# Patient Record
Sex: FEMALE | Race: WHITE | NOT HISPANIC OR LATINO
[De-identification: names, ages, dates, MRNs, and addresses within clinical notes are randomized per-mention and may not be internally consistent; named-entity substitution may affect disease eponyms.]

---

## 2019-02-12 ENCOUNTER — APPOINTMENT (OUTPATIENT)
Dept: HEART AND VASCULAR | Facility: CLINIC | Age: 64
End: 2019-02-12

## 2019-02-21 PROBLEM — F32.9 DEPRESSION, UNSPECIFIED DEPRESSION TYPE: Status: ACTIVE | Noted: 2019-02-21

## 2019-02-25 ENCOUNTER — NON-APPOINTMENT (OUTPATIENT)
Age: 64
End: 2019-02-25

## 2019-02-25 ENCOUNTER — APPOINTMENT (OUTPATIENT)
Dept: HEART AND VASCULAR | Facility: CLINIC | Age: 64
End: 2019-02-25
Payer: COMMERCIAL

## 2019-02-25 VITALS — HEIGHT: 66 IN | WEIGHT: 181 LBS | BODY MASS INDEX: 29.09 KG/M2

## 2019-02-25 VITALS — DIASTOLIC BLOOD PRESSURE: 77 MMHG | SYSTOLIC BLOOD PRESSURE: 125 MMHG | HEART RATE: 78 BPM | OXYGEN SATURATION: 93 %

## 2019-02-25 DIAGNOSIS — F32.9 MAJOR DEPRESSIVE DISORDER, SINGLE EPISODE, UNSPECIFIED: ICD-10-CM

## 2019-02-25 PROCEDURE — 99214 OFFICE O/P EST MOD 30 MIN: CPT | Mod: 25

## 2019-02-25 PROCEDURE — 93000 ELECTROCARDIOGRAM COMPLETE: CPT

## 2019-02-25 NOTE — REASON FOR VISIT
[Follow-Up - Clinic] : a clinic follow-up of [FreeTextEntry1] : Diagnostic Tests:\par --------------------------------------\par ECG:\par 02/25/19: NSR, normal ECG. \par 04/09/18: NSR, frequent APCs.\par 08/11/16: NSR, normal ECG.\par --------------------------------------\par Echo:\par 05/01/18: EF 63%, trace MR/TR. \par 08/04/16: EF 65%, grade I diastolic dysfunction, trace TR\par --------------------------------------\par CT:\par 08/03/16: head: normal\par 08/03/16: CTA head and neck: normal \par --------------------------------------\par MR:\par 08/04/16: brain: small left insula infarct.

## 2019-02-25 NOTE — HISTORY OF PRESENT ILLNESS
[FreeTextEntry1] : Ms. Dasilva presents for follow up and management of impaired fasting glucose, dyslipidemia, AYE, and left MCA CVA (08/03/16). She was admitted to [facility] on 08/03/16 after having sudden onset of left hand numbness and aphasia. The symptoms persisted for several hours. She had a CT head and CTA head an neck on 08/03/16 both of which were normal. She had a MR of the brain on 08/04/16 which revealed a small left insula infarct. She was evaluated by neurology, Cheyenne Kilpatrick MD and was diagnosed with a left MCA CVA. She also saw a heart rhythm doctor, Arie Kerns MD, and had an loop recorder implanted. She had an echocardiogram which revealed normal LV systolic function and no valvular heart disease. She had paroxysmal atrial tachycardia as a child. Since being discharged, she has not had recurrence of her symptoms. We had an extensive discussion about the embolic nature of her stroke and the likelihood that there may be occult atrial fibrillation. She has completed the RESPECT-ESUS research trial for patients with embolic CVA of undetermined source to study ASA vs. Pradaxa.  She denies palpitations or bleeding. Her implantable loop recorder interrogation today did not reveal any evidence of dysrhythmia.   She had followed with a psychiatrist in the past and was on an SSRI. We had an extensive discussion about strategies to treat her depression and, although she does not want to start seeing a psychiatrist again and is not willing to start an SSRI at this time. She feels better regarding her affective disorder. She has complaints of rare palpitations but is otherwise well.

## 2019-02-25 NOTE — PHYSICAL EXAM
[General Appearance - Well Developed] : well developed [Normal Appearance] : normal appearance [Well Groomed] : well groomed [General Appearance - Well Nourished] : well nourished [No Deformities] : no deformities [General Appearance - In No Acute Distress] : no acute distress [Normal Conjunctiva] : the conjunctiva exhibited no abnormalities [Eyelids - No Xanthelasma] : the eyelids demonstrated no xanthelasmas [Normal Oral Mucosa] : normal oral mucosa [No Oral Pallor] : no oral pallor [No Oral Cyanosis] : no oral cyanosis [Normal Jugular Venous A Waves Present] : normal jugular venous A waves present [Normal Jugular Venous V Waves Present] : normal jugular venous V waves present [No Jugular Venous Benson A Waves] : no jugular venous benson A waves [Respiration, Rhythm And Depth] : normal respiratory rhythm and effort [Exaggerated Use Of Accessory Muscles For Inspiration] : no accessory muscle use [Auscultation Breath Sounds / Voice Sounds] : lungs were clear to auscultation bilaterally [Normal Rate] : normal [Normal S1] : normal S1 [Normal S2] : normal S2 [No Murmur] : no murmurs heard [2+] : left 2+ [No Abnormalities] : the abdominal aorta was not enlarged and no bruit was heard [No Pitting Edema] : no pitting edema present [Abdomen Soft] : soft [Abdomen Tenderness] : non-tender [Abdomen Mass (___ Cm)] : no abdominal mass palpated [Abnormal Walk] : normal gait [Gait - Sufficient For Exercise Testing] : the gait was sufficient for exercise testing [Nail Clubbing] : no clubbing of the fingernails [Cyanosis, Localized] : no localized cyanosis [Petechial Hemorrhages (___cm)] : no petechial hemorrhages [Skin Color & Pigmentation] : normal skin color and pigmentation [] : no rash [No Venous Stasis] : no venous stasis [Skin Lesions] : no skin lesions [No Skin Ulcers] : no skin ulcer [No Xanthoma] : no  xanthoma was observed [Oriented To Time, Place, And Person] : oriented to person, place, and time [Affect] : the affect was normal [Mood] : the mood was normal [No Anxiety] : not feeling anxious [S3] : no S3 [S4] : no S4 [Right Carotid Bruit] : no bruit heard over the right carotid [Left Carotid Bruit] : no bruit heard over the left carotid [Right Femoral Bruit] : no bruit heard over the right femoral artery [Left Femoral Bruit] : no bruit heard over the left femoral artery

## 2019-02-25 NOTE — ASSESSMENT
[FreeTextEntry1] : 1. Cerebrovascular accident (CVA): CVA: small left insula (MCA) (08/03/16); symptoms resolved\par             - follow up with neurologist, Cheyenne Kilpatrick MD\par             - completed RESPECT-ESUS trial (ASA 100mg po daily vs. Pradaxa 150mg po bid), will continue ASA 81mg po daily \par             - will send for an echocardiogram\par \par 2. Paroxysmal a-fib: r/o occult AF: given history of PAT and embolic CVA 08/03/16, has implantable loop recorder in place, no events to date\par             - continue follow up with device clinic and home interrogations\par  \par 3. DM2: has lost 20 pounds with TLC\par             - discussed with patient therapeutic lifestyle changes to improve glucose metabolism\par \par 4. Asthma \par             - continue Flovent Diskus Aerosol Powder Breath Activated, 250 MCG/BLIST, 1 puff, Inhalation, Twice a day\par             - continue Albuterol Sulfate HFA Aerosol Solution, 108 (90 Base) MCG/ACT, 2 puffs as needed, Inhalation, every 4 hrs\par            - continue Symbocort\par \par 5. Presence of other cardiac implants and grafts: Implantable loop recorder: placed 2016, no events on today's interrogation (08/29/18)\par             - continue follow up with device clinic (will switch to [facility] device clinic)\par \par 6. Hypercholesteremia \par             - continue Atorvastatin Calcium Tablet, 80 MG, 1 tablet, Orally, Once a day\par  \par 7. Depression: non-major: h/o SSRI use and psychiatry visits\par             - patient does not wish to see a psychiatrist or re-try an SSRI at this time. \par  \par 8. AYE: \par             - continue CPAP\par

## 2019-03-11 ENCOUNTER — APPOINTMENT (OUTPATIENT)
Dept: HEART AND VASCULAR | Facility: CLINIC | Age: 64
End: 2019-03-11
Payer: COMMERCIAL

## 2019-03-11 PROCEDURE — 93306 TTE W/DOPPLER COMPLETE: CPT

## 2019-06-27 ENCOUNTER — APPOINTMENT (OUTPATIENT)
Dept: HEART AND VASCULAR | Facility: CLINIC | Age: 64
End: 2019-06-27
Payer: COMMERCIAL

## 2019-06-27 ENCOUNTER — NON-APPOINTMENT (OUTPATIENT)
Age: 64
End: 2019-06-27

## 2019-06-27 VITALS
BODY MASS INDEX: 28.61 KG/M2 | HEART RATE: 85 BPM | WEIGHT: 178 LBS | DIASTOLIC BLOOD PRESSURE: 66 MMHG | HEIGHT: 66 IN | SYSTOLIC BLOOD PRESSURE: 124 MMHG

## 2019-06-27 PROCEDURE — 93291 INTERROG DEV EVAL SCRMS IP: CPT

## 2019-06-27 PROCEDURE — 99202 OFFICE O/P NEW SF 15 MIN: CPT | Mod: 25

## 2019-06-27 PROCEDURE — 99212 OFFICE O/P EST SF 10 MIN: CPT | Mod: 25

## 2019-07-11 NOTE — HISTORY OF PRESENT ILLNESS
[Palpitations] : no palpitations [SOB] : no dyspnea [Syncope] : no syncope [Pain at Site] : no pain at device site [Dizziness] : no dizziness [Chest Pain] : no chest pain or discomfort [Swelling at Site] : no swelling at device site [de-identified] : Ms. Dasilva is a 64 year old female with sleep apnea (non compliant with CPAP) history of a CVA s/p ILR, who has transitioned care to [facility] and presents to [facility] care.\par \par She states she had an embolic stroke in 2016.  She had an ILR implanted and would like to transition care here.  No device related complaints.  No chest pain, SOB, palpitations, recurrent neurological event.  \par   [Erythema at Site] : no erythema at device site

## 2019-07-11 NOTE — PROCEDURE
[de-identified] : medtronic REVEAL LINQ \par 8/2016\par [id number]\par battery good\par sensing good\par no events

## 2019-07-11 NOTE — DISCUSSION/SUMMARY
[FreeTextEntry1] : Ms. Dasivla is a 64 year old female with sleep apnea (non compliant with CPAP) history of a CVA s/p ILR, who has transitioned care to [facility] and presents to [facility] care.  ILR interrogation reveals no arrhythmias or evidence of atrial fibrillation.  We will request that her remote monitoring be transferred to our clinic.  She will follow up in 6 months or sooner if needed.  She knows to call with any questions or concerns.

## 2019-07-11 NOTE — PHYSICAL EXAM
[General Appearance - Well Developed] : well developed [Normal Appearance] : normal appearance [Well Groomed] : well groomed [General Appearance - Well Nourished] : well nourished [No Deformities] : no deformities [General Appearance - In No Acute Distress] : no acute distress [Heart Rate And Rhythm] : heart rate and rhythm were normal [Heart Sounds] : normal S1 and S2 [Edema] : no peripheral edema present [Respiration, Rhythm And Depth] : normal respiratory rhythm and effort [Exaggerated Use Of Accessory Muscles For Inspiration] : no accessory muscle use [Auscultation Breath Sounds / Voice Sounds] : lungs were clear to auscultation bilaterally [Clean] : clean [Dry] : dry [Well-Healed] : well-healed [] : no ischemic changes [Bleeding] : no active bleeding [Palpable Crepitus] : no palpable crepitus [Serosanguineous Drainage] : no serosanquineous drainage [Purulent Drainage] : no purulent drainage [Foul Odor] : no foul smell [Erythema] : not erythematous [Serous Drainage] : no serous drainage [Warm] : not warm [Tender] : not tender [Indurated] : not indurated [Fluctuant] : not fluctuant [FreeTextEntry1] : mid L chest

## 2019-09-03 ENCOUNTER — APPOINTMENT (OUTPATIENT)
Dept: HEART AND VASCULAR | Facility: CLINIC | Age: 64
End: 2019-09-03
Payer: COMMERCIAL

## 2019-09-03 VITALS
TEMPERATURE: 98.3 F | DIASTOLIC BLOOD PRESSURE: 66 MMHG | HEIGHT: 66 IN | OXYGEN SATURATION: 96 % | SYSTOLIC BLOOD PRESSURE: 102 MMHG | BODY MASS INDEX: 29.57 KG/M2 | HEART RATE: 76 BPM | WEIGHT: 184 LBS

## 2019-09-03 DIAGNOSIS — J45.20 MILD INTERMITTENT ASTHMA, UNCOMPLICATED: ICD-10-CM

## 2019-09-03 DIAGNOSIS — N60.19 DIFFUSE CYSTIC MASTOPATHY OF UNSPECIFIED BREAST: ICD-10-CM

## 2019-09-03 PROCEDURE — 99214 OFFICE O/P EST MOD 30 MIN: CPT

## 2019-09-03 NOTE — ASSESSMENT
[FreeTextEntry1] : 1. Cerebrovascular accident (CVA): CVA: small left insula (MCA) (08/03/16); symptoms resolved\par             - follow up with neurologist, Cheyenne Kilpatrick MD\par             - completed RESPECT-ESUS trial (ASA 100mg po daily vs. Pradaxa 150mg po bid), will continue ASA 81mg po daily. \par \par 2. 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 DM2: has lost 20 pounds with TLC\par             - discussed with patient therapeutic lifestyle changes to improve glucose metabolism\par \par 4. Asthma \par             - continue Flovent Diskus Aerosol Powder Breath Activated, 250 MCG/BLIST, 1 puff, Inhalation, Twice a day\par             - continue Albuterol Sulfate HFA Aerosol Solution, 108 (90 Base) MCG/ACT, 2 puffs as needed, Inhalation, every 4 hrs\par            - continue Symbocort\par \par 5. s/p Implantable loop recorder: placed 2016, no events interrogation to date\par             - continue follow up with device clinic (Paulino Cho MD)\par \par 6. Hypercholesteremia \par             - continue Atorvastatin Calcium Tablet, 80 MG, 1 tablet, Orally, Once a day\par  \par 7. Depression: non-major: h/o SSRI use and psychiatry visits\par             - patient does not wish to see a psychiatrist or re-try an SSRI at this time. \par  \par 8. AYE: \par             - continue CPAP\par \par 9. Fibrocystic breast disease:\par             - will send for b/l mammography and breast sonography\par

## 2019-09-03 NOTE — REASON FOR VISIT
[FreeTextEntry1] : Diagnostic Tests:\par --------------------------------------\par ECG:\par 02/25/19: NSR, normal ECG. \par 04/09/18: NSR, frequent APCs.\par 08/11/16: NSR, normal ECG.\par --------------------------------------\par Echo:\par 03/11/19: EF 58%, normal echo. \par 05/01/18: EF 63%, trace MR/TR. \par 08/04/16: EF 65%, grade I diastolic dysfunction, trace TR\par --------------------------------------\par CT:\par 08/03/16: head: normal\par 08/03/16: CTA head and neck: normal \par --------------------------------------\par MR:\par 08/04/16: brain: small left insula infarct.

## 2019-09-03 NOTE — PHYSICAL EXAM
[Heart Rate And Rhythm] : heart rate and rhythm were normal [Heart Sounds] : normal S1 and S2 [Arterial Pulses Normal] : the arterial pulses were normal [S3] : no S3 [S4] : no S4 [Right Carotid Bruit] : no bruit heard over the right carotid [Left Carotid Bruit] : no bruit heard over the left carotid [Right Femoral Bruit] : no bruit heard over the right femoral artery [Left Femoral Bruit] : no bruit heard over the left femoral artery

## 2019-09-03 NOTE — HISTORY OF PRESENT ILLNESS
[FreeTextEntry1] : Ms. Dasilva presents for follow up and management of impaired fasting glucose, dyslipidemia, AYE, and left MCA CVA (08/03/16). She was admitted to [facility] on 08/03/16 after having sudden onset of left hand numbness and aphasia. The symptoms persisted for several hours. She had a CT head and CTA head an neck on 08/03/16 both of which were normal. She had a MR of the brain on 08/04/16 which revealed a small left insula infarct. She was evaluated by neurology, Cheyenne Kilpatrick MD and was diagnosed with a left MCA CVA. She also saw a heart rhythm doctor, Arie Kerns MD, and had an loop recorder implanted. She had an echocardiogram which revealed normal LV systolic function and no valvular heart disease. She had paroxysmal atrial tachycardia as a child. Since being discharged, she has not had recurrence of her symptoms. We had an extensive discussion about the embolic nature of her stroke and the likelihood that there may be occult atrial fibrillation. She has completed the RESPECT-ESUS research trial for patients with embolic CVA of undetermined source to study ASA vs. Pradaxa.  She denies palpitations or bleeding. Her implantable loop recorder interrogation today did not reveal any evidence of dysrhythmia.   She had followed with a psychiatrist in the past and was on an SSRI. We had an extensive discussion about strategies to treat her depression and, although she does not want to start seeing a psychiatrist again and is not willing to start an SSRI at this time. She feels better regarding her affective disorder. Presently denies palpitations/ chest discomfort and is well except for a pain in her left breast (3 o'clock position) and tenderness in the axilla when palpated.

## 2019-10-10 ENCOUNTER — APPOINTMENT (OUTPATIENT)
Dept: OTOLARYNGOLOGY | Facility: CLINIC | Age: 64
End: 2019-10-10
Payer: COMMERCIAL

## 2019-10-10 ENCOUNTER — TRANSCRIPTION ENCOUNTER (OUTPATIENT)
Age: 64
End: 2019-10-10

## 2019-10-10 VITALS
WEIGHT: 184 LBS | DIASTOLIC BLOOD PRESSURE: 74 MMHG | HEIGHT: 66 IN | OXYGEN SATURATION: 95 % | BODY MASS INDEX: 29.57 KG/M2 | SYSTOLIC BLOOD PRESSURE: 112 MMHG | TEMPERATURE: 97.8 F | HEART RATE: 77 BPM

## 2019-10-10 PROCEDURE — 31575 DIAGNOSTIC LARYNGOSCOPY: CPT

## 2019-10-10 PROCEDURE — 99204 OFFICE O/P NEW MOD 45 MIN: CPT | Mod: 25

## 2019-10-10 NOTE — ASSESSMENT
[FreeTextEntry1] : Discussed allergen mitigation and provided the patient with the corresponding educational handout; reviewed proper nasal steroid administration technique.\par Reviewed reflux precautions and provided the patient with the corresponding educational handout.\par RTC 6 wks

## 2019-10-10 NOTE — CONSULT LETTER
[Dear  ___] : Dear  [unfilled], [Consult Letter:] : I had the pleasure of evaluating your patient, [unfilled]. [Please see my note below.] : Please see my note below. [Sincerely,] : Sincerely, [Consult Closing:] : Thank you very much for allowing me to participate in the care of this patient.  If you have any questions, please do not hesitate to contact me. [FreeTextEntry3] : DIEGO Arellano Jr, MD, FAAOHNS\par Otolaryngologist\par [facility]

## 2019-10-10 NOTE — HISTORY OF PRESENT ILLNESS
[de-identified] : Many years of off & on PND symptoms worse in the fall; this seems to "go into the chest" and cause bronchitis. No facial pain but is "aware" of her sinuses. Some nasal congestion & scant discolored rhinorrhea w/ mild spring/fall allergies. Has cats in the house. \par Globus and clearing; dry cough. Hx of reflux laryngitis & partially follows a reflux diet. 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

## 2019-10-10 NOTE — PHYSICAL EXAM
[FreeTextEntry1] : hoarse voice [Laryngoscopy Performed] : laryngoscopy was performed, see procedure section for findings [Normal] : no rashes

## 2019-11-18 ENCOUNTER — APPOINTMENT (OUTPATIENT)
Dept: OTOLARYNGOLOGY | Facility: CLINIC | Age: 64
End: 2019-11-18
Payer: COMMERCIAL

## 2019-11-18 VITALS
SYSTOLIC BLOOD PRESSURE: 124 MMHG | BODY MASS INDEX: 29.57 KG/M2 | WEIGHT: 184 LBS | DIASTOLIC BLOOD PRESSURE: 73 MMHG | HEIGHT: 66 IN | OXYGEN SATURATION: 98 % | HEART RATE: 78 BPM

## 2019-11-18 PROCEDURE — 99214 OFFICE O/P EST MOD 30 MIN: CPT

## 2019-11-18 NOTE — ASSESSMENT
[FreeTextEntry1] : Reinforced behavioral modification as previously discussed. RTC 3 months; cont CPAP.

## 2019-11-18 NOTE — HISTORY OF PRESENT ILLNESS
[de-identified] : Many years of off & on PND symptoms worse in the fall; this seems to "go into the chest" and cause bronchitis. This is improved since last seen. \par Some nasal congestion & scant discolored rhinorrhea w/ mild spring/fall allergies. Has cats in the house. \par Improved globus and clearing but ongoing dry cough. Hx of reflux laryngitis & partially follows a reflux diet. Sings at [employer] and feels that her voice has improved since last seen. Saw a pulmonologist who cleared her. \par AYE on CPAP.

## 2019-11-25 ENCOUNTER — APPOINTMENT (OUTPATIENT)
Dept: PULMONOLOGY | Facility: CLINIC | Age: 64
End: 2019-11-25
Payer: COMMERCIAL

## 2019-11-25 VITALS
HEART RATE: 77 BPM | WEIGHT: 193 LBS | SYSTOLIC BLOOD PRESSURE: 117 MMHG | TEMPERATURE: 97.5 F | DIASTOLIC BLOOD PRESSURE: 69 MMHG | OXYGEN SATURATION: 95 % | BODY MASS INDEX: 31.02 KG/M2 | HEIGHT: 66 IN

## 2019-11-25 PROCEDURE — 99244 OFF/OP CNSLTJ NEW/EST MOD 40: CPT

## 2019-11-25 NOTE — REVIEW OF SYSTEMS
[EDS: ESS=____] : daytime somnolence: ESS=[unfilled] [Obesity] : obesity [Difficulty Initiating Sleep] : no difficulty falling asleep [Difficulty Maintaining Sleep] : no difficulty maintaining sleep [Unusual Sleep Behavior] : no unusual sleep behavior [Lower Extremity Discomfort] : no lower extremity discomfort [Late day/ Evening symptoms] : no late day/evening symptoms [Cataplexy] :  no cataplexy [Hypersomnolence] : not sleeping much more than usual [Negative] : Psychiatric

## 2019-11-25 NOTE — CONSULT LETTER
[Dear  ___] : Dear  [unfilled], [Consult Letter:] : I had the pleasure of evaluating your patient, [unfilled]. [Please see my note below.] : Please see my note below. [Consult Closing:] : Thank you very much for allowing me to participate in the care of this patient.  If you have any questions, please do not hesitate to contact me. [Sincerely,] : Sincerely, [FreeTextEntry3] : Vero Patel MD\par \par [employer] [facility]\par Pulmonary, Critical Care, and Sleep Medicine\par

## 2019-11-25 NOTE — HISTORY OF PRESENT ILLNESS
[FreeTextEntry1] : 64y with long standing AYE on CPAP, ?childhood asthma referred for AYE management. Last sleep study was aobut 1 year ago; severity is unclear but she did have low oxygen saturations. Study was overnight; not available for review. Has a new machine; about 1 year old. Does not perceive a subjective benefit despite use. Use is not very consistent but even when it is she is still not endorsing great benefit. Total sleep time is around hours per night. DME is [facility] [employer]. She does not know the severity of her AYE when she was initially diagnosed about 10 years ago.  [Obstructive Sleep Apnea] : obstructive sleep apnea [Snoring] : snoring [Witnessed Apneas] : witnessed sleep apnea [Daytime Somnolence] : daytime somnolence [Awakes Unrefreshed] : awakening unrefreshed [Unintentional Sleep While Inactive] : unintentional sleep while inactive [Awakening With Dry Mouth] : awakening with dry mouth [Recent  Weight Gain] : recent weight gain [DMS] : no DMS [Unusual Sleep Behavior] : no unusual sleep behavior [DIS] : no DIS [Hypersomnolence] : no hypersomnolence [Cataplexy] : no cataplexy [Sleep Paralysis] : no sleep paralysis [Hypnagogic Hallucinations] : no hallucinations when falling asleep [Hypnopompic Hallucinations] : no hallucinations when awakening

## 2019-11-25 NOTE — PHYSICAL EXAM
[General Appearance - Well Developed] : well developed [Normal Appearance] : normal appearance [Well Groomed] : well groomed [General Appearance - Well Nourished] : well nourished [No Deformities] : no deformities [General Appearance - In No Acute Distress] : no acute distress [Normal Conjunctiva] : the conjunctiva exhibited no abnormalities [Neck Appearance] : the appearance of the neck was normal [Normal Oropharynx] : normal oropharynx [Apical Impulse] : the apical impulse was normal [Heart Rate And Rhythm] : heart rate was normal and rhythm regular [] : no respiratory distress [Respiration, Rhythm And Depth] : normal respiratory rhythm and effort [Exaggerated Use Of Accessory Muscles For Inspiration] : no accessory muscle use [Abnormal Walk] : normal gait [Auscultation Breath Sounds / Voice Sounds] : lungs were clear to auscultation bilaterally [Nail Clubbing] : no clubbing of the fingernails [Involuntary Movements] : no involuntary movements were seen [Cyanosis, Localized] : no localized cyanosis [No Focal Deficits] : no focal deficits [Oriented To Time, Place, And Person] : oriented to person, place, and time [Impaired Insight] : insight and judgment were intact [Affect] : the affect was normal [Mood] : the mood was normal

## 2019-11-25 NOTE — ASSESSMENT
[FreeTextEntry1] : 64y with long standing AYE on CPAP, ?childhood asthma referred for AYE management. Referred by Dr. Arellano. Severity of AYE is unknown at this time; she will get me her last sleep study for my review. Ms. Dasilva is not perceiving subjective benefit from CPAP use. This may be secondary to ineffective PAP therapy versus short total sleep time, and is most likely multifactorial. I have requested [facility] [employer] to link our accounts so I can have access to the objective efficacy and adherence data. The ramifications of AYE and its treatment were discussed in detail. I will make further management decisions once I review the compliance/efficacy data and sleep study. Ms. Dasilav will follow up in 4 weeks.

## 2020-01-02 ENCOUNTER — APPOINTMENT (OUTPATIENT)
Dept: HEART AND VASCULAR | Facility: CLINIC | Age: 65
End: 2020-01-02
Payer: COMMERCIAL

## 2020-01-02 VITALS
HEART RATE: 82 BPM | SYSTOLIC BLOOD PRESSURE: 109 MMHG | HEIGHT: 66 IN | OXYGEN SATURATION: 95 % | DIASTOLIC BLOOD PRESSURE: 73 MMHG | BODY MASS INDEX: 31.52 KG/M2 | TEMPERATURE: 97.8 F | WEIGHT: 196.13 LBS

## 2020-01-02 PROCEDURE — 99214 OFFICE O/P EST MOD 30 MIN: CPT

## 2020-01-02 NOTE — ASSESSMENT
[FreeTextEntry1] : 1. Cerebrovascular accident (CVA): CVA: small left insula (MCA) (08/03/16); symptoms resolved\par             - follow up with neurologist, Cheyenne Kilpatrick MD\par             - completed RESPECT-ESUS trial (ASA 100mg po daily vs. Pradaxa 150mg po bid), will continue ASA 81mg po daily. \par \par 2. 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 DM2:\par             - discussed with patient therapeutic lifestyle changes to improve glucose metabolism\par \par 4. Asthma \par             - continue albuterol prn\par \par 5. s/p Implantable loop recorder: placed 2016, no events interrogation to date\par             - continue follow up with device clinic (Paulino Cho MD)\par \par 6. Hypercholesteremia \par             - continue Atorvastatin Calcium Tablet, 80 MG, 1 tablet, Orally, Once a day\par  \par 7. Depression: non-major: h/o SSRI use and psychiatry visits\par             - patient does not wish to see a psychiatrist or re-try an SSRI at this time. \par  \par 8. AYE: \par             - continue CPAP\par \par

## 2020-01-02 NOTE — HISTORY OF PRESENT ILLNESS
[FreeTextEntry1] : Ms. Dasilva presents for follow up and management of impaired fasting glucose, dyslipidemia, AYE, and left MCA CVA (08/03/16). She was admitted to [facility] on 08/03/16 after having sudden onset of left hand numbness and aphasia. The symptoms persisted for several hours. She had a CT head and CTA head an neck on 08/03/16 both of which were normal. She had a MR of the brain on 08/04/16 which revealed a small left insula infarct. She was evaluated by neurology, Cheyenne Kilpatrick MD and was diagnosed with a left MCA CVA. She also saw a heart rhythm doctor, Arie Kerns MD, and had an loop recorder implanted. She had an echocardiogram which revealed normal LV systolic function and no valvular heart disease. She had paroxysmal atrial tachycardia as a child. Since being discharged, she has not had recurrence of her symptoms. We had an extensive discussion about the embolic nature of her stroke and the likelihood that there may be occult atrial fibrillation. She has completed the RESPECT-ESUS research trial for patients with embolic CVA of undetermined source to study ASA vs. Pradaxa.  She denies palpitations or bleeding. Her implantable loop recorder interrogation today did not reveal any evidence of dysrhythmia.   She had followed with a psychiatrist in the past and was on an SSRI. We had an extensive discussion about strategies to treat her depression and, although she does not want to start seeing a psychiatrist again and is not willing to start an SSRI at this time. She feels better regarding her affective disorder.  She will be traveling to [place] in the near future.  She admits to dietary indiscretion with ice cream and has gained about 15 pounds as a result.  We had an extensive discussion about therapeutic lifestyle changes to promote increased cardiovascular fitness and achieving goal weight.\par

## 2020-01-14 ENCOUNTER — APPOINTMENT (OUTPATIENT)
Dept: OTOLARYNGOLOGY | Facility: CLINIC | Age: 65
End: 2020-01-14
Payer: COMMERCIAL

## 2020-01-14 VITALS
TEMPERATURE: 97.6 F | HEIGHT: 66 IN | SYSTOLIC BLOOD PRESSURE: 114 MMHG | HEART RATE: 94 BPM | WEIGHT: 196 LBS | BODY MASS INDEX: 31.5 KG/M2 | DIASTOLIC BLOOD PRESSURE: 72 MMHG | OXYGEN SATURATION: 97 %

## 2020-01-14 DIAGNOSIS — K21.9 GASTRO-ESOPHAGEAL REFLUX DISEASE W/OUT ESOPHAGITIS: ICD-10-CM

## 2020-01-14 DIAGNOSIS — J30.2 OTHER SEASONAL ALLERGIC RHINITIS: ICD-10-CM

## 2020-01-14 PROCEDURE — 99214 OFFICE O/P EST MOD 30 MIN: CPT

## 2020-01-14 NOTE — HISTORY OF PRESENT ILLNESS
[de-identified] : Improved globus and clearing but still w/ some dry cough and improved PND symptom. Hx of reflux laryngitis & partially follows reflux precautions but still has some late meals. Sings at [employer] and feels that her voice has improved a lot since last seen. \par Some minor nasal congestion & scant discolored rhinorrhea w/ mild spring/fall allergies; this is well controlled on flonase. Has cats in the house. \par Moderate AYE & has been working w/ Dr. Patel on better tolerance.

## 2020-02-10 ENCOUNTER — APPOINTMENT (OUTPATIENT)
Dept: RHEUMATOLOGY | Facility: CLINIC | Age: 65
End: 2020-02-10
Payer: COMMERCIAL

## 2020-02-10 VITALS
TEMPERATURE: 97.8 F | HEIGHT: 66 IN | OXYGEN SATURATION: 95 % | DIASTOLIC BLOOD PRESSURE: 65 MMHG | BODY MASS INDEX: 31.08 KG/M2 | HEART RATE: 77 BPM | WEIGHT: 193.38 LBS | SYSTOLIC BLOOD PRESSURE: 102 MMHG

## 2020-02-10 DIAGNOSIS — M72.2 PLANTAR FASCIAL FIBROMATOSIS: ICD-10-CM

## 2020-02-10 DIAGNOSIS — Z78.9 OTHER SPECIFIED HEALTH STATUS: ICD-10-CM

## 2020-02-10 DIAGNOSIS — Z87.39 PERSONAL HISTORY OF OTHER DISEASES OF THE MUSCULOSKELETAL SYSTEM AND CONNECTIVE TISSUE: ICD-10-CM

## 2020-02-10 DIAGNOSIS — Z00.00 ENCOUNTER FOR GENERAL ADULT MEDICAL EXAMINATION W/OUT ABNORMAL FINDINGS: ICD-10-CM

## 2020-02-10 DIAGNOSIS — M77.02 MEDIAL EPICONDYLITIS, LEFT ELBOW: ICD-10-CM

## 2020-02-10 PROCEDURE — 99205 OFFICE O/P NEW HI 60 MIN: CPT | Mod: 25

## 2020-02-10 PROCEDURE — 36415 COLL VENOUS BLD VENIPUNCTURE: CPT

## 2020-02-10 RX ORDER — MULTIVIT-MIN/FOLIC/VIT K/LYCOP 400-300MCG
TABLET ORAL
Refills: 0 | Status: ACTIVE | COMMUNITY

## 2020-02-10 NOTE — HISTORY OF PRESENT ILLNESS
[Cough] : cough [Arthralgias] : arthralgias [Decreased ROM] : decreased range of motion [Joint Deformity] : joint deformity [Muscle Spasms] : muscle spasms [Anorexia] : no anorexia [FreeTextEntry1] : Self Referred for Rheumatology consultation \par PMD: Trish Laguerre ( [facility]) \par \par \par 64 y/o very pleasant F with dyslipidemia, AYE, left CVA ( 08/03/16) on ASA , Asthma ( recently stopped  inhalers as normal PFTS) , laryngopharyngeal reflux\par R > left 5th finger DIP pain and flexion, states started around fall time, does not remember any inciting event or trauma. Extension is very painful. Denies weakness or numbness sensation. \par She has hx of left elbow lateral epicondytitis diagnosed in 2016, saw Ortho and recommended to stop gardening which she did and symptoms improved.  \par For past few weeks recurrence of medial elbow pain worse during ROM. \par Hx of R plantar fasciitis and has recurrence since November, gradually improving, has  not done PT this time or injection.\par She was on Vacation in [place]  (stayed [place] in [place] [place] [facility] ) h[place] and  visited farm. \par ON return last Monday she had low grade fever for 2 days  99.9  and non bloody  diarrhea resolved 1 day after took Imodium. \par Since return R LE is more swollen, denies hx of DVT/VTE\par \par No h/o morning stiffness, memory loss, patchy hair loss, sicca symptoms, photosensitivity, HA,  Raynaud's, oral ulcers, nasal ulcers. \par Personal or family hx of autoimmune disease, no hx of psoriasis\par Grandmother RA. \par Osteoporosis screen: not sure, perhaps long time ago. \par LMP at age 48, no fracture. \par \par  [Weight Loss] : no weight loss [Fever] : no fever [Malaise] : no malaise [Chills] : no chills [Malar Facial Rash] : no malar facial rash [Depression] : no depression [Fatigue] : no fatigue [Skin Nodules] : no skin nodules [Skin Lesions] : no lesions [Dry Mouth] : no dry mouth [Oral Ulcers] : no oral ulcers [Dysphonia] : no dysphonia [Dysphagia] : no dysphagia [Chest Pain] : no chest pain [Shortness of Breath] : no shortness of breath [Joint Swelling] : no joint swelling [Joint Warmth] : no joint warmth [Morning Stiffness] : no morning stiffness [Falls] : no falls [Myalgias] : no myalgias [Muscle Weakness] : no muscle weakness [Dyspnea] : no dyspnea [Visual Changes] : no visual changes [Muscle Cramping] : no muscle cramping [Dry Eyes] : no dry eyes [Eye Pain] : no eye pain

## 2020-02-10 NOTE — ASSESSMENT
[FreeTextEntry1] : 64 y/o very pleasant F with dyslipidemia, AYE, left CVA ( 08/03/16) on ASA , Asthma ( recently stopped  inhalers as normal PFTS) , laryngopharyngeal reflux, hx of plantar fasciitis and lateral epicondylitis diagnosed in 2016, resolved. \par Now presents with R 5th finger DIP flexion deformity without any inciting event or trauma, also left elbow pain. \par On exam she was found to have medical epicondylitis of left elbow and R>L 5th DIP joint flexion painful reducible deformity suspicious for mallet finger, also other PIP and DIP joint bony proliferation suggestive of hand OA.\par In the setting of lack of constitutional symptoms, prolonged AM stiffness, active synovitis low suspicious for inflammatory arthritis at this time. \par Has R LE edema had recent 5-6 hour flight and would r/u LE DVT. \par \par 1. Medial epicondylitis: As part of our initial management of  medical epicondylitis, we talked to apply a counter force brace, pain management with OTC NSAIDS next 2 weeks.  \par Patient should try to avoid activities that exacerbate symptoms, no heavy lifting or repetitive flexion/extension of the elbow.\par \par 2. Mallet Finger of R 5th DIP: can't recall any trauma, could be secondary to OA/osteophytes \par avoid hyperextension \par recommended extension splinting of DIP joint for 6-8 \par b/l hand and wrist XRay \par Basic Rheum labs to rule out inflammatory disease/RA causing tendinopathy \par May consider to refer to hand Ortho \par \par 3. R LE edema; obtain US to rule out DVT\par \par f/u 3 weeks

## 2020-02-10 NOTE — PHYSICAL EXAM
[General Appearance - Alert] : alert [General Appearance - In No Acute Distress] : in no acute distress [General Appearance - Well Nourished] : well nourished [Sclera] : the sclera and conjunctiva were normal [General Appearance - Well Developed] : well developed [Outer Ear] : the ears and nose were normal in appearance [PERRL With Normal Accommodation] : pupils were equal in size, round, and reactive to light [Neck Appearance] : the appearance of the neck was normal [Examination Of The Oral Cavity] : the lips and gums were normal [Neck Cervical Mass (___cm)] : no neck mass was observed [Jugular Venous Distention Increased] : there was no jugular-venous distention [Thyroid Diffuse Enlargement] : the thyroid was not enlarged [Thyroid Nodule] : there were no palpable thyroid nodules [Respiration, Rhythm And Depth] : normal respiratory rhythm and effort [Auscultation Breath Sounds / Voice Sounds] : lungs were clear to auscultation bilaterally [Heart Rate And Rhythm] : heart rate was normal and rhythm regular [Heart Sounds] : normal S1 and S2 [Murmurs] : no murmurs [Heart Sounds Pericardial Friction Rub] : no pericardial rub [Veins - Varicosity Changes] : there were no varicosital changes [Full Pulse] : the pedal pulses are present [Abdomen Soft] : soft [Abdomen Tenderness] : non-tender [Abdomen Mass (___ Cm)] : no abdominal mass palpated [Cervical Lymph Nodes Enlarged Posterior Bilaterally] : posterior cervical [Cervical Lymph Nodes Enlarged Anterior Bilaterally] : anterior cervical [Supraclavicular Lymph Nodes Enlarged Bilaterally] : supraclavicular [No CVA Tenderness] : no ~M costovertebral angle tenderness [No Spinal Tenderness] : no spinal tenderness [Nail Clubbing] : no clubbing  or cyanosis of the fingernails [Abnormal Walk] : normal gait [Musculoskeletal - Swelling] : no joint swelling seen [Motor Tone] : muscle strength and tone were normal [] : no rash [Skin Lesions] : no skin lesions [Sensation] : the sensory exam was normal to light touch and pinprick [Motor Exam] : the motor exam was normal [Oriented To Time, Place, And Person] : oriented to person, place, and time [Impaired Insight] : insight and judgment were intact [Affect] : the affect was normal [Mood] : the mood was normal [FreeTextEntry1] : R 5th DIP reducible flexion deformity, tender, bony proliferation of multiple DIP/PIP joints, lesser degree left 5th DIP flexion/reducible, Localized tenderness over the medial epicondyle on the left, Pain with passive terminal wrist extension with the elbow in full extension

## 2020-02-10 NOTE — REVIEW OF SYSTEMS
[Lower Ext Edema] : lower extremity edema [Cough] : cough [Joint Pain] : joint pain [Arthralgias] : arthralgias [Fever] : no fever [Chills] : no chills [Feeling Poorly] : not feeling poorly [Feeling Tired] : not feeling tired [Recent Weight Gain (___ Lbs)] : no recent weight gain [Recent Weight Loss (___ Lbs)] : no recent weight loss [Red Eyes] : eyes not red [Eye Pain] : no eye pain [Eyesight Problems] : no eyesight problems [Earache] : no earache [Heart Rate Is Slow] : the heart rate was not slow [Sore Throat] : no sore throat [Chest Pain] : no chest pain [Leg Claudication] : no intermittent leg claudication [Palpitations] : no palpitations [Wheezing] : no wheezing [Orthopnea] : no orthopnea [Shortness Of Breath] : no shortness of breath [PND] : no PND [Heartburn] : no heartburn [Joint Swelling] : no joint swelling [Joint Stiffness] : no joint stiffness [Itching] : no itching [Swollen Glands] : no swollen glands [Easy Bleeding] : no tendency for easy bleeding [Easy Bruising] : no tendency for easy bruising [Swollen Glands In The Neck] : no swollen glands in the neck

## 2020-02-11 DIAGNOSIS — R71.8 OTHER ABNORMALITY OF RED BLOOD CELLS: ICD-10-CM

## 2020-02-14 LAB
25(OH)D3 SERPL-MCNC: 39.3 NG/ML
ALBUMIN SERPL ELPH-MCNC: 4.4 G/DL
ALP BLD-CCNC: 81 U/L
ALT SERPL-CCNC: 23 U/L
ANION GAP SERPL CALC-SCNC: 13 MMOL/L
AST SERPL-CCNC: 21 U/L
BASOPHILS # BLD AUTO: 0.06 K/UL
BASOPHILS NFR BLD AUTO: 1 %
BILIRUB SERPL-MCNC: 0.8 MG/DL
BUN SERPL-MCNC: 14 MG/DL
CALCIUM SERPL-MCNC: 9.6 MG/DL
CALCIUM SERPL-MCNC: 9.6 MG/DL
CCP AB SER IA-ACNC: <8 UNITS
CHLORIDE SERPL-SCNC: 108 MMOL/L
CO2 SERPL-SCNC: 23 MMOL/L
CREAT SERPL-MCNC: 0.85 MG/DL
CRP SERPL-MCNC: 0.27 MG/DL
EOSINOPHIL # BLD AUTO: 0.21 K/UL
EOSINOPHIL NFR BLD AUTO: 3.4 %
ERYTHROCYTE [SEDIMENTATION RATE] IN BLOOD BY WESTERGREN METHOD: 15 MM/HR
FERRITIN SERPL-MCNC: 99 NG/ML
GLUCOSE SERPL-MCNC: 98 MG/DL
HCT VFR BLD CALC: 45.7 %
HGB BLD-MCNC: 15 G/DL
IMM GRANULOCYTES NFR BLD AUTO: 0.2 %
IRON SATN MFR SERPL: 27 %
IRON SERPL-MCNC: 80 UG/DL
LYMPHOCYTES # BLD AUTO: 2.12 K/UL
LYMPHOCYTES NFR BLD AUTO: 33.9 %
MAGNESIUM SERPL-MCNC: 2.2 MG/DL
MAN DIFF?: NORMAL
MCHC RBC-ENTMCNC: 29.1 PG
MCHC RBC-ENTMCNC: 32.8 GM/DL
MCV RBC AUTO: 88.6 FL
MONOCYTES # BLD AUTO: 0.56 K/UL
MONOCYTES NFR BLD AUTO: 9 %
NEUTROPHILS # BLD AUTO: 3.29 K/UL
NEUTROPHILS NFR BLD AUTO: 52.5 %
PARATHYROID HORMONE INTACT: 28 PG/ML
PHOSPHATE SERPL-MCNC: 3.5 MG/DL
PLATELET # BLD AUTO: 220 K/UL
POTASSIUM SERPL-SCNC: 4.6 MMOL/L
PROT SERPL-MCNC: 6.5 G/DL
RBC # BLD: 5.16 M/UL
RBC # FLD: 13.2 %
RF+CCP IGG SER-IMP: NEGATIVE
RHEUMATOID FACT SER QL: <10 IU/ML
SODIUM SERPL-SCNC: 144 MMOL/L
TIBC SERPL-MCNC: 299 UG/DL
TRANSFERRIN SERPL-MCNC: 229 MG/DL
TSH SERPL-ACNC: 1.86 UIU/ML
UIBC SERPL-MCNC: 219 UG/DL
URATE SERPL-MCNC: 5.6 MG/DL
WBC # FLD AUTO: 6.25 K/UL

## 2020-04-23 ENCOUNTER — APPOINTMENT (OUTPATIENT)
Dept: HEART AND VASCULAR | Facility: CLINIC | Age: 65
End: 2020-04-23
Payer: COMMERCIAL

## 2020-04-23 VITALS — HEIGHT: 66 IN | BODY MASS INDEX: 30.53 KG/M2 | WEIGHT: 190 LBS

## 2020-04-23 PROCEDURE — 99214 OFFICE O/P EST MOD 30 MIN: CPT | Mod: 95

## 2020-04-23 NOTE — REASON FOR VISIT
[Follow-Up - Clinic] : a clinic follow-up of [FreeTextEntry1] : Diagnostic Tests:\par --------------------------------------\par ECG:\par 02/25/19: NSR, normal ECG. \par 04/09/18: NSR, frequent APCs.\par 08/11/16: NSR, normal ECG.\par --------------------------------------\par Echo:\par 03/11/19: EF 58%, normal echo. \par 05/01/18: EF 63%, trace MR/TR. \par 08/04/16: EF 65%, grade I diastolic dysfunction, trace TR\par --------------------------------------\par CT:\par 08/03/16: head: normal\par 08/03/16: CTA head and neck: normal \par --------------------------------------\par MR:\par 08/04/16: brain: small left insula infarct.

## 2020-04-23 NOTE — PHYSICAL EXAM
[General Appearance - Well Developed] : well developed [Well Groomed] : well groomed [General Appearance - Well Nourished] : well nourished [Normal Appearance] : normal appearance [General Appearance - In No Acute Distress] : no acute distress [Normal Conjunctiva] : the conjunctiva exhibited no abnormalities [No Deformities] : no deformities [Eyelids - No Xanthelasma] : the eyelids demonstrated no xanthelasmas [Normal Oral Mucosa] : normal oral mucosa [No Oral Pallor] : no oral pallor [No Oral Cyanosis] : no oral cyanosis [Normal Jugular Venous A Waves Present] : normal jugular venous A waves present [Normal Jugular Venous V Waves Present] : normal jugular venous V waves present [No Jugular Venous Benson A Waves] : no jugular venous benson A waves [Respiration, Rhythm And Depth] : normal respiratory rhythm and effort [Exaggerated Use Of Accessory Muscles For Inspiration] : no accessory muscle use [Auscultation Breath Sounds / Voice Sounds] : lungs were clear to auscultation bilaterally [Heart Rate And Rhythm] : heart rate and rhythm were normal [Heart Sounds] : normal S1 and S2 [Arterial Pulses Normal] : the arterial pulses were normal [Abdomen Soft] : soft [Abdomen Tenderness] : non-tender [Abdomen Mass (___ Cm)] : no abdominal mass palpated [Abnormal Walk] : normal gait [Gait - Sufficient For Exercise Testing] : the gait was sufficient for exercise testing [Nail Clubbing] : no clubbing of the fingernails [Cyanosis, Localized] : no localized cyanosis [Petechial Hemorrhages (___cm)] : no petechial hemorrhages [Skin Color & Pigmentation] : normal skin color and pigmentation [] : no rash [No Venous Stasis] : no venous stasis [Skin Lesions] : no skin lesions [No Xanthoma] : no  xanthoma was observed [No Skin Ulcers] : no skin ulcer [Mood] : the mood was normal [Oriented To Time, Place, And Person] : oriented to person, place, and time [Affect] : the affect was normal [No Anxiety] : not feeling anxious [Normal Rate] : normal [Normal S1] : normal S1 [Normal S2] : normal S2 [No Murmur] : no murmurs heard [2+] : left 2+ [No Abnormalities] : the abdominal aorta was not enlarged and no bruit was heard [No Pitting Edema] : no pitting edema present [S3] : no S3 [S4] : no S4 [Left Carotid Bruit] : no bruit heard over the left carotid [Right Femoral Bruit] : no bruit heard over the right femoral artery [Right Carotid Bruit] : no bruit heard over the right carotid [Left Femoral Bruit] : no bruit heard over the left femoral artery

## 2020-05-18 ENCOUNTER — APPOINTMENT (OUTPATIENT)
Dept: RHEUMATOLOGY | Facility: CLINIC | Age: 65
End: 2020-05-18
Payer: COMMERCIAL

## 2020-05-18 ENCOUNTER — RESULT REVIEW (OUTPATIENT)
Age: 65
End: 2020-05-18

## 2020-05-18 ENCOUNTER — APPOINTMENT (OUTPATIENT)
Dept: RADIOLOGY | Facility: CLINIC | Age: 65
End: 2020-05-18

## 2020-05-18 ENCOUNTER — OUTPATIENT (OUTPATIENT)
Dept: OUTPATIENT SERVICES | Facility: HOSPITAL | Age: 65
LOS: 1 days | End: 2020-05-18
Payer: COMMERCIAL

## 2020-05-18 DIAGNOSIS — M20.019 MALLET FINGER OF UNSPECIFIED FINGER(S): ICD-10-CM

## 2020-05-18 PROCEDURE — 99214 OFFICE O/P EST MOD 30 MIN: CPT | Mod: 95

## 2020-05-18 PROCEDURE — 73130 X-RAY EXAM OF HAND: CPT | Mod: 26,50

## 2020-05-18 PROCEDURE — 77080 DXA BONE DENSITY AXIAL: CPT | Mod: 26

## 2020-05-18 PROCEDURE — 73110 X-RAY EXAM OF WRIST: CPT | Mod: 26,50

## 2020-05-18 NOTE — REVIEW OF SYSTEMS
[Lower Ext Edema] : lower extremity edema [Cough] : cough [Arthralgias] : arthralgias [Joint Pain] : joint pain [Fever] : no fever [Chills] : no chills [Feeling Poorly] : not feeling poorly [Feeling Tired] : not feeling tired [Recent Weight Gain (___ Lbs)] : no recent weight gain [Recent Weight Loss (___ Lbs)] : no recent weight loss [Eye Pain] : no eye pain [Eyesight Problems] : no eyesight problems [Red Eyes] : eyes not red [Earache] : no earache [Sore Throat] : no sore throat [Heart Rate Is Slow] : the heart rate was not slow [Chest Pain] : no chest pain [Palpitations] : no palpitations [Leg Claudication] : no intermittent leg claudication [Shortness Of Breath] : no shortness of breath [Wheezing] : no wheezing [Orthopnea] : no orthopnea [PND] : no PND [Heartburn] : no heartburn [Joint Swelling] : no joint swelling [Joint Stiffness] : no joint stiffness [Itching] : no itching [Easy Bleeding] : no tendency for easy bleeding [Easy Bruising] : no tendency for easy bruising [Swollen Glands] : no swollen glands [Swollen Glands In The Neck] : no swollen glands in the neck

## 2020-05-18 NOTE — PHYSICAL EXAM
[General Appearance - Alert] : alert [General Appearance - In No Acute Distress] : in no acute distress [General Appearance - Well Nourished] : well nourished [Respiration, Rhythm And Depth] : normal respiratory rhythm and effort [Abnormal Walk] : normal gait [Skin Color & Pigmentation] : normal skin color and pigmentation [] : no rash [Skin Lesions] : no skin lesions [Oriented To Time, Place, And Person] : oriented to person, place, and time [Impaired Insight] : insight and judgment were intact [Affect] : the affect was normal [Mood] : the mood was normal [FreeTextEntry1] : EXAM: XR WRIST COMP MIN 3 VIEWS BI \par EXAM: XR HAND MIN 3 VIEWS BI \par \par PROCEDURE DATE: 05/18/2020 \par \par \par \par \par INTERPRETATION: INDICATION: ARTHRAIGIA \par \par 3 views of each hand and 3 views of each wrist have been submitted. No \par fracture or dislocation is identified. Carpal joint spaces are preserved. \par There is asymmetric joint space narrowing subchondral sclerosis and \par osteophyte formation at the DIP joints of both hands, most pronounced in the \par fifth digit. No periostitis or erosion is identified. \par \par \par IMPRESSION: Findings consistent with osteoarthritis involving the DIP joints \par of the hands.

## 2020-05-18 NOTE — HISTORY OF PRESENT ILLNESS
[Patient] : the patient [Other Location: e.g. Home (Enter Location, City,State)___] : at [unfilled] [Arthralgias] : arthralgias [Joint Deformity] : joint deformity [Decreased ROM] : decreased range of motion [Muscle Spasms] : muscle spasms [___ Month(s) Ago] : [unfilled] month(s) ago [Other Location: e.g. School (Enter Location, City,State)___] : at [unfilled], at the time of the visit. [Cough] : cough [FreeTextEntry2] : informed about possible technology privacy risks and possible costs associated with encounter [FreeTextEntry1] : Follow up: 5/18/20\par right 5th finger mallet: likely secondary to OA, has not used splint as recommended.\par Medial epicondylitis of left  elbow: resolved \par Had essentially normal labs, hand  Xray  reviewed with pt and suggestive hand OA, worse affecting  left 5th DIP where pt has most symptoms. \par chronic R LE swelling, she has not done US as recommended during initial visit to rule out DVT>\par \par \par Self Referred for Rheumatology consultation \par PMD: Trish Laguerre ( [facility]) \par \par \par 64 y/o very pleasant F with dyslipidemia, AYE, left CVA ( 08/03/16) on ASA , Asthma ( recently stopped  inhalers as normal PFTS) , laryngopharyngeal reflux\par R > left 5th finger DIP pain and flexion, states started around fall time, does not remember any inciting event or trauma. Extension is very painful. Denies weakness or numbness sensation. \par She has hx of left elbow lateral epicondytitis diagnosed in 2016, saw Ortho and recommended to stop gardening which she did and symptoms improved.  \par For past few weeks recurrence of medial elbow pain worse during ROM. \par Hx of R plantar fasciitis and has recurrence since November, gradually improving, has  not done PT this time or injection.\par She was on Vacation in [place]  [place] and  visited farm. \par ON return last Monday she had low grade fever for 2 days  99.9  and non bloody  diarrhea resolved 1 day after took Imodium. \par Since return R LE is more swollen, denies hx of DVT/VTE\par \par No h/o morning stiffness, memory loss, patchy hair loss, sicca symptoms, photosensitivity, HA,  Raynaud's, oral ulcers, nasal ulcers. \par Personal or family hx of autoimmune disease, no hx of psoriasis\par Grandmother RA. \par Osteoporosis screen: not sure, perhaps long time ago. \par LMP at age 48, no fracture. \par \par  [Anorexia] : no anorexia [Weight Loss] : no weight loss [Malaise] : no malaise [Fever] : no fever [Chills] : no chills [Fatigue] : no fatigue [Depression] : no depression [Malar Facial Rash] : no malar facial rash [Skin Lesions] : no lesions [Skin Nodules] : no skin nodules [Oral Ulcers] : no oral ulcers [Dry Mouth] : no dry mouth [Dysphonia] : no dysphonia [Dysphagia] : no dysphagia [Shortness of Breath] : no shortness of breath [Chest Pain] : no chest pain [Joint Swelling] : no joint swelling [Joint Warmth] : no joint warmth [Morning Stiffness] : no morning stiffness [Falls] : no falls [Dyspnea] : no dyspnea [Myalgias] : no myalgias [Muscle Weakness] : no muscle weakness [Muscle Cramping] : no muscle cramping [Visual Changes] : no visual changes [Eye Pain] : no eye pain [Dry Eyes] : no dry eyes

## 2020-05-18 NOTE — DATA REVIEWED
[FreeTextEntry1] : EXAM: XR WRIST COMP MIN 3 VIEWS BI \par EXAM: XR HAND MIN 3 VIEWS BI \par \par PROCEDURE DATE: 05/18/2020 \par \par \par \par \par INTERPRETATION: INDICATION: ARTHRAIGIA \par \par 3 views of each hand and 3 views of each wrist have been submitted. No \par fracture or dislocation is identified. Carpal joint spaces are preserved. \par There is asymmetric joint space narrowing subchondral sclerosis and \par osteophyte formation at the DIP joints of both hands, most pronounced in the \par fifth digit. No periostitis or erosion is identified. \par \par \par IMPRESSION: Findings consistent with osteoarthritis involving the DIP joints \par of the hands.

## 2020-05-18 NOTE — ASSESSMENT
[FreeTextEntry1] : 64 y/o very pleasant F with dyslipidemia, AYE, left CVA ( 08/03/16) on ASA , Asthma , laryngopharyngeal reflux, hx of plantar fasciitis and lateral epicondylitis diagnosed in 2016, resolved. \par She presented on 2/2020  with R 5th finger DIP flexion deformity without any inciting event or trauma, also left elbow pain. \par On exam she was found to have medical epicondylitis of left elbow and R>L 5th DIP joint flexion painful reducible deformity , also other PIP and DIP joint bony proliferation suggestive of hand OA and Xray has been confirmed OA. \par She has normal Rheum labs and no evidence of inflammatory arthritis ( lack of constitutional symptoms,  prolonged AM stiffness, active synovitis) \par She continues to experience R LE edema, she has not done US to rule out DVT. \par Medial epicondylitis has been resolved \par \par \par 1. Hand OA: with DIP flexion deformities \par Recommended Hand Therapy/ OT, OTC diclofenac gel.\par  NSAID side effects discussed in great detail  including but not limited to risk of GI and other bleeding, allergic reactions, high blood pressure, CV risk, Headaches and dizziness, kidney disease, however with topical administration and less systemic absorption expect above side effect overall less concerning.  \par \par \par 2. Mallet Finger of R 5th DIP: can't recall any trauma,  secondary to OA/osteophytes \par avoid hyperextension \par recommended extension splinting of DIP joint for 6-8 weeks, she has not done and reminded today. \par May consider to refer to hand Ortho in the future, declined today. \par \par 3. R LE edema; reminded again today to obtain LE US to rule out DVT, pt agreed to have test done. \par \par 4. Bone Health: \par Check DXA\par \par \par f/u 3 months or earlier prn\par \par 25 Minutes face to face  telehealth encounter \par 50% of time spend on counselling and/or coordinating of care\par

## 2020-05-19 ENCOUNTER — OUTPATIENT (OUTPATIENT)
Dept: OUTPATIENT SERVICES | Facility: HOSPITAL | Age: 65
LOS: 1 days | End: 2020-05-19

## 2020-05-19 ENCOUNTER — APPOINTMENT (OUTPATIENT)
Dept: ULTRASOUND IMAGING | Facility: CLINIC | Age: 65
End: 2020-05-19
Payer: COMMERCIAL

## 2020-05-19 ENCOUNTER — RESULT REVIEW (OUTPATIENT)
Age: 65
End: 2020-05-19

## 2020-05-19 PROCEDURE — 93970 EXTREMITY STUDY: CPT | Mod: 26

## 2020-05-20 ENCOUNTER — TRANSCRIPTION ENCOUNTER (OUTPATIENT)
Age: 65
End: 2020-05-20

## 2020-07-21 ENCOUNTER — APPOINTMENT (OUTPATIENT)
Dept: HEART AND VASCULAR | Facility: CLINIC | Age: 65
End: 2020-07-21
Payer: COMMERCIAL

## 2020-07-21 VITALS
OXYGEN SATURATION: 96 % | HEART RATE: 100 BPM | HEIGHT: 66 IN | BODY MASS INDEX: 32.47 KG/M2 | DIASTOLIC BLOOD PRESSURE: 71 MMHG | SYSTOLIC BLOOD PRESSURE: 104 MMHG | WEIGHT: 202 LBS

## 2020-07-21 PROCEDURE — 99214 OFFICE O/P EST MOD 30 MIN: CPT

## 2020-07-21 NOTE — HISTORY OF PRESENT ILLNESS
[FreeTextEntry1] : Ms. Dasilva presents for follow up and management of impaired fasting glucose, dyslipidemia, AYE, and left MCA CVA (08/03/16). She was admitted to [facility] on 08/03/16 after having sudden onset of left hand numbness and aphasia. The symptoms persisted for several hours. She had a CT head and CTA head an neck on 08/03/16 both of which were normal. She had a MR of the brain on 08/04/16 which revealed a small left insula infarct. She was evaluated by neurology, Cheyenne Kilpatrick MD and was diagnosed with a left MCA CVA. She also saw a heart rhythm doctor, Arie Kerns MD, and had an loop recorder implanted. She had an echocardiogram which revealed normal LV systolic function and no valvular heart disease. She had paroxysmal atrial tachycardia as a child. Since being discharged, she has not had recurrence of her symptoms. We had an extensive discussion about the embolic nature of her stroke and the likelihood that there may be occult atrial fibrillation. She has completed the RESPECT-ESUS research trial for patients with embolic CVA of undetermined source to study ASA vs. Pradaxa.  She denies palpitations or bleeding. Her implantable loop recorder interrogation today did not reveal any evidence of dysrhythmia.   She had followed with a psychiatrist in the past and was on an SSRI. We had an extensive discussion about strategies to treat her depression and, although she does not want to start seeing a psychiatrist again and is not willing to start an SSRI at this time. She feels better regarding her affective disorder.  She will be traveling to [place] in the near future.  She admits to dietary indiscretion with ice cream and has gained about 15 pounds as a result.  We had an extensive discussion about therapeutic lifestyle changes to promote increased cardiovascular fitness and achieving goal weight.  She is doing well during the COVID crisis.\par

## 2020-07-21 NOTE — PHYSICAL EXAM
[Well Groomed] : well groomed [General Appearance - Well Nourished] : well nourished [Normal Appearance] : normal appearance [General Appearance - Well Developed] : well developed [General Appearance - In No Acute Distress] : no acute distress [No Deformities] : no deformities [Normal Conjunctiva] : the conjunctiva exhibited no abnormalities [Normal Oral Mucosa] : normal oral mucosa [Eyelids - No Xanthelasma] : the eyelids demonstrated no xanthelasmas [Normal Jugular Venous A Waves Present] : normal jugular venous A waves present [No Oral Pallor] : no oral pallor [No Oral Cyanosis] : no oral cyanosis [Normal Jugular Venous V Waves Present] : normal jugular venous V waves present [No Jugular Venous Benson A Waves] : no jugular venous benson A waves [Exaggerated Use Of Accessory Muscles For Inspiration] : no accessory muscle use [Respiration, Rhythm And Depth] : normal respiratory rhythm and effort [Heart Sounds] : normal S1 and S2 [Auscultation Breath Sounds / Voice Sounds] : lungs were clear to auscultation bilaterally [Heart Rate And Rhythm] : heart rate and rhythm were normal [Arterial Pulses Normal] : the arterial pulses were normal [Abdomen Tenderness] : non-tender [Abdomen Soft] : soft [Abnormal Walk] : normal gait [Abdomen Mass (___ Cm)] : no abdominal mass palpated [Gait - Sufficient For Exercise Testing] : the gait was sufficient for exercise testing [Cyanosis, Localized] : no localized cyanosis [Nail Clubbing] : no clubbing of the fingernails [Skin Color & Pigmentation] : normal skin color and pigmentation [Petechial Hemorrhages (___cm)] : no petechial hemorrhages [Skin Lesions] : no skin lesions [] : no rash [No Venous Stasis] : no venous stasis [No Xanthoma] : no  xanthoma was observed [No Skin Ulcers] : no skin ulcer [Mood] : the mood was normal [Oriented To Time, Place, And Person] : oriented to person, place, and time [Affect] : the affect was normal [Normal S1] : normal S1 [Normal Rate] : normal [No Anxiety] : not feeling anxious [Normal S2] : normal S2 [No Murmur] : no murmurs heard [No Abnormalities] : the abdominal aorta was not enlarged and no bruit was heard [2+] : right 2+ [No Pitting Edema] : no pitting edema present [S3] : no S3 [S4] : no S4 [Right Carotid Bruit] : no bruit heard over the right carotid [Left Carotid Bruit] : no bruit heard over the left carotid [Right Femoral Bruit] : no bruit heard over the right femoral artery [Left Femoral Bruit] : no bruit heard over the left femoral artery

## 2020-07-21 NOTE — ASSESSMENT
[FreeTextEntry1] : 1. Cerebrovascular accident (CVA): CVA: small left insula (MCA) (08/03/16); symptoms resolved\par             - follow up with neurologist, Cheyenne Kilpatrick MD\par             - completed RESPECT-ESUS trial (ASA 100mg po daily vs. Pradaxa 150mg po bid), will continue ASA 81mg po daily. \par \par 2. 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 DM2:\par             - discussed with patient therapeutic lifestyle changes to improve glucose metabolism\par \par 4. Asthma \par             - continue albuterol prn\par \par 5. s/p Implantable loop recorder: placed 2016, no events interrogation to date\par             - continue follow up with device clinic and consider explantation\par \par 6. Hypercholesteremia \par             - continue Atorvastatin Calcium Tablet, 80 MG, 1 tablet, Orally, Once a day\par  \par 7. Depression: non-major: h/o SSRI use and psychiatry visits\par             - patient does not wish to see a psychiatrist or re-try an SSRI at this time. \par  \par 8. AYE: \par             - continue CPAP\par \par

## 2020-08-18 ENCOUNTER — APPOINTMENT (OUTPATIENT)
Dept: RHEUMATOLOGY | Facility: CLINIC | Age: 65
End: 2020-08-18
Payer: COMMERCIAL

## 2020-08-18 VITALS
SYSTOLIC BLOOD PRESSURE: 119 MMHG | OXYGEN SATURATION: 97 % | TEMPERATURE: 97.2 F | WEIGHT: 205 LBS | BODY MASS INDEX: 32.95 KG/M2 | HEART RATE: 79 BPM | HEIGHT: 66 IN | DIASTOLIC BLOOD PRESSURE: 66 MMHG

## 2020-08-18 PROCEDURE — 99214 OFFICE O/P EST MOD 30 MIN: CPT

## 2020-08-18 NOTE — HISTORY OF PRESENT ILLNESS
[___ Month(s) Ago] : [unfilled] month(s) ago [Cough] : cough [Arthralgias] : arthralgias [Joint Deformity] : joint deformity [Decreased ROM] : decreased range of motion [Muscle Spasms] : muscle spasms [FreeTextEntry1] : Follow up: 8/18/20 \par \par 64 y/o F with hand OA\par Medial epicondylitis of left  elbow: resolved \par Had essentially normal labs, hand  Xray  reviewed with pt and suggestive hand OA, worse affecting  left 5th DIP where pt has most symptoms. \par chronic R LE swelling, US negative for DVT\par DXA-osteopenia \par \par \par \par Follow up: 5/18/20\par right 5th finger mallet: likely secondary to OA, has not used splint as recommended.\par Medial epicondylitis of left  elbow: resolved \par Had essentially normal labs, hand  Xray  reviewed with pt and suggestive hand OA, worse affecting  left 5th DIP where pt has most symptoms. \par chronic R LE swelling, she has not done US as recommended during initial visit to rule out DVT>\par \par \par Self Referred for Rheumatology consultation \par PMD: Trish Laguerre ( [facility]) \par \par \par 64 y/o very pleasant F with dyslipidemia, AYE, left CVA ( 08/03/16) on ASA , Asthma ( recently stopped  inhalers as normal PFTS) , laryngopharyngeal reflux\par R > left 5th finger DIP pain and flexion, states started around fall time, does not remember any inciting event or trauma. Extension is very painful. Denies weakness or numbness sensation. \par She has hx of left elbow lateral epicondytitis diagnosed in 2016, saw Ortho and recommended to stop gardening which she did and symptoms improved.  \par For past few weeks recurrence of medial elbow pain worse during ROM. \par Hx of R plantar fasciitis and has recurrence since November, gradually improving, has  not done PT this time or injection.\par She was on Vacation in [place]  ([place] h[place] and  visited [place]. \par ON return last Monday she had low grade fever for 2 days  99.9  and non bloody  diarrhea resolved 1 day after took Imodium. \par Since return R LE is more swollen, denies hx of DVT/VTE\par \par No h/o morning stiffness, memory loss, patchy hair loss, sicca symptoms, photosensitivity, HA,  Raynaud's, oral ulcers, nasal ulcers. \par Personal or family hx of autoimmune disease, no hx of psoriasis\par Grandmother RA. \par Osteoporosis screen: not sure, perhaps long time ago. \par LMP at age 48, no fracture. \par \par  [Anorexia] : no anorexia [Weight Loss] : no weight loss [Malaise] : no malaise [Fever] : no fever [Chills] : no chills [Fatigue] : no fatigue [Depression] : no depression [Malar Facial Rash] : no malar facial rash [Skin Nodules] : no skin nodules [Skin Lesions] : no lesions [Oral Ulcers] : no oral ulcers [Dry Mouth] : no dry mouth [Dysphonia] : no dysphonia [Dysphagia] : no dysphagia [Shortness of Breath] : no shortness of breath [Chest Pain] : no chest pain [Joint Swelling] : no joint swelling [Joint Warmth] : no joint warmth [Morning Stiffness] : no morning stiffness [Falls] : no falls [Dyspnea] : no dyspnea [Myalgias] : no myalgias [Muscle Weakness] : no muscle weakness [Muscle Cramping] : no muscle cramping [Visual Changes] : no visual changes [Eye Pain] : no eye pain [Dry Eyes] : no dry eyes

## 2020-08-18 NOTE — PHYSICAL EXAM
[General Appearance - Alert] : alert [General Appearance - In No Acute Distress] : in no acute distress [General Appearance - Well Nourished] : well nourished [Respiration, Rhythm And Depth] : normal respiratory rhythm and effort [Abnormal Walk] : normal gait [Skin Color & Pigmentation] : normal skin color and pigmentation [] : no rash [Skin Lesions] : no skin lesions [Impaired Insight] : insight and judgment were intact [Oriented To Time, Place, And Person] : oriented to person, place, and time [Affect] : the affect was normal [Mood] : the mood was normal [Sclera] : the sclera and conjunctiva were normal [Heart Sounds] : normal S1 and S2 [Heart Rate And Rhythm] : heart rate was normal and rhythm regular [Murmurs] : no murmurs [Heart Sounds Pericardial Friction Rub] : no pericardial rub [Nail Clubbing] : no clubbing  or cyanosis of the fingernails [Motor Tone] : muscle strength and tone were normal [Motor Exam] : the motor exam was normal [Sensation] : the sensory exam was normal to light touch and pinprick [FreeTextEntry1] : nosis of the fingernails, no joint swelling seen and muscle strength and tone were normal . R 5th DIP reducible flexion deformity, tender, bony proliferation of multiple DIP/PIP joints, lesser degree left 5th DIP flexion/reducible

## 2020-08-18 NOTE — ASSESSMENT
[FreeTextEntry1] : 66 y/o very pleasant F with dyslipidemia, AYE, left CVA ( 08/03/16) on ASA , Asthma , laryngopharyngeal reflux, hx of plantar fasciitis and lateral epicondylitis diagnosed in 2016, resolved. \par She presented on 2/2020  with R 5th finger DIP flexion deformity without any inciting event or trauma, also left elbow pain. \par On exam she was found to have medical epicondylitis of left elbow and R>L 5th DIP joint flexion painful reducible deformity , also other PIP and DIP joint bony proliferation suggestive of hand OA and Xray has been confirmed OA. \par She has normal Rheum labs and no evidence of inflammatory arthritis ( lack of constitutional symptoms,  prolonged AM stiffness, active synovitis)\par  R LE edema,  US ruled out DVT. \par Medial epicondylitis has been resolved \par \par \par 1. Hand OA: with DIP flexion deformities \par Recommended Hand Therapy/ OT, OTC diclofenac gel.\par  NSAID side effects discussed in great detail  including but not limited to risk of GI and other bleeding, allergic reactions, high blood pressure, CV risk, Headaches and dizziness, kidney disease, however with topical administration and less systemic absorption expect above side effect overall less concerning. \par I have explained pt that there is no specific therapy beside topicals and OT to stop her symptoms, with PT/OT she can delay further hand functional impairment.  \par \par \par 2. Mallet Finger of R 5th DIP: can't recall any trauma,  secondary to OA/osteophytes \par avoid hyperextension \par recommended extension splinting of DIP joint for 6-8 weeks, she has not done it. \par May consider to refer to hand Ortho in the future. \par \par 3. Bone Health: \par DXA osteopenia \par Calcium 1200 mg daily from diet and supplements (to be taken in divided doses as no more than 500-600 mg can be absorbed at one time)\par Continue current vitamin D regimen\par Diet, exercise and fall prevention discussed. \par Need to repeat DXA in 2-3 years from 2020. \par \par \par f/u 1 year or sooner prn \par \par

## 2020-09-15 NOTE — ASSESSMENT
Called pt due to [facility] home monitor isn't reading.  He has received new monitor and hadn't plugged it up. Worked with him for 15 minutes unable to get monitor to connect.  He is going to call [facility] to figure out what is going on.     [FreeTextEntry1] : 1. Cerebrovascular accident (CVA): CVA: small left insula (MCA) (08/03/16); symptoms resolved\par             - follow up with neurologist, Cheyenne Kilpatrick MD\par             - completed RESPECT-ESUS trial (ASA 100mg po daily vs. Pradaxa 150mg po bid), will continue ASA 81mg po daily. \par \par 2. 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 DM2:\par             - discussed with patient therapeutic lifestyle changes to improve glucose metabolism\par \par 4. Asthma \par             - continue albuterol prn\par \par 5. s/p Implantable loop recorder: placed 2016, no events interrogation to date\par             - continue follow up with device clinic (Paulino Cho MD)\par \par 6. Hypercholesteremia \par             - continue Atorvastatin Calcium Tablet, 80 MG, 1 tablet, Orally, Once a day\par  \par 7. Depression: non-major: h/o SSRI use and psychiatry visits\par             - patient does not wish to see a psychiatrist or re-try an SSRI at this time. \par  \par 8. AYE: \par             - continue CPAP\par \par Due to the current global COVID-19 pandemic and the recommendations for social distancing this encounter was converted from an in-person face-to-face encounter to a telehealth encounter employing the [employer] (or other approved) audio/video platform.  All components of the evaluation and management were performed per clinical routine with the exception of the physical exam.  The physical exam references my most recent physical exam plus any additional information provided by the patient (i.e. ambulatory vitals/weight) or inspection from the video portion of the encounter.  I spent in excess of 25 minutes on the encounter.  \par \par Verbal consent was given on 04/23/20 at 9:00am by Soheila Dasilva.\par \par \par

## 2020-10-15 ENCOUNTER — RX RENEWAL (OUTPATIENT)
Age: 65
End: 2020-10-15

## 2020-11-17 NOTE — REVIEW OF SYSTEMS
Pt is being scheduled for Right shoulder arthroscopy with distal clavicle resection, rotator cuff repair, biceps tenodesis surgery. He states that he has a pinched nerve in his neck. He would like to know if this could be causing some of his shoulder issues.   [Lower Ext Edema] : lower extremity edema [Cough] : cough [Arthralgias] : arthralgias [Joint Pain] : joint pain [Feeling Poorly] : not feeling poorly [Chills] : no chills [Fever] : no fever [Feeling Tired] : not feeling tired [Recent Weight Loss (___ Lbs)] : no recent weight loss [Recent Weight Gain (___ Lbs)] : no recent weight gain [Eye Pain] : no eye pain [Red Eyes] : eyes not red [Eyesight Problems] : no eyesight problems [Earache] : no earache [Sore Throat] : no sore throat [Heart Rate Is Slow] : the heart rate was not slow [Palpitations] : no palpitations [Chest Pain] : no chest pain [Leg Claudication] : no intermittent leg claudication [Shortness Of Breath] : no shortness of breath [Wheezing] : no wheezing [Orthopnea] : no orthopnea [Heartburn] : no heartburn [PND] : no PND [Joint Swelling] : no joint swelling [Joint Stiffness] : no joint stiffness [Easy Bruising] : no tendency for easy bruising [Easy Bleeding] : no tendency for easy bleeding [Itching] : no itching [Swollen Glands] : no swollen glands [Swollen Glands In The Neck] : no swollen glands in the neck

## 2020-11-28 ENCOUNTER — EMERGENCY (EMERGENCY)
Facility: HOSPITAL | Age: 65
LOS: 1 days | Discharge: HOME | End: 2020-11-28
Admitting: EMERGENCY MEDICINE
Payer: COMMERCIAL

## 2020-11-28 VITALS
TEMPERATURE: 99 F | OXYGEN SATURATION: 97 % | SYSTOLIC BLOOD PRESSURE: 112 MMHG | WEIGHT: 205.03 LBS | DIASTOLIC BLOOD PRESSURE: 73 MMHG | HEIGHT: 66 IN | RESPIRATION RATE: 20 BRPM | HEART RATE: 87 BPM

## 2020-11-28 DIAGNOSIS — Z20.828 CONTACT WITH AND (SUSPECTED) EXPOSURE TO OTHER VIRAL COMMUNICABLE DISEASES: ICD-10-CM

## 2020-11-28 PROCEDURE — 99283 EMERGENCY DEPT VISIT LOW MDM: CPT

## 2020-11-28 NOTE — ED PROVIDER NOTE - PHYSICAL EXAMINATION
Gen - WDWN, NAD, comfortable and non-toxic appearing  Skin - warm, dry, intact   Resp - Breathing comfortably on RA.  Neuro - AxOx3, clear speech, grossly unremarkable.  Psych - Calm and cooperative. Normal mood and affect.

## 2020-11-28 NOTE — ED PROVIDER NOTE - NSFOLLOWUPINSTRUCTIONS_ED_ALL_ED_FT
IF YOU DO NOT GET YOUR RESULTS WITHIN 48 HOURS PLEASE CALL [phone].    IF YOUR RESULT COMES BACK POSITIVE:    1. STAY HOME for 14 DAYS  2. Minimize Human contact to ONLY ESSENTIAL  3. Every time you wash your hands, sing the HAPPY BIRTHDAY Song so you know you're washing long enough.  Make sure to scrub the webspace between your fingers.  4. DRINK 1-3 Liters of fluids day x at least 5 days.  To remain hydrated. Your fatigue, lightheadedness, and body aches will decrease and your fever has a better chance of breaking if you are well hydrated.    5. For your Fever and Body aches takes Tylenol 650-100mg every 4-6h (max 4000mg/day). Try not to use ibuprofen, aspirin or naproxen (Advil, Motrin or Aleve) as these may worsen Coronavirus infection.  6. RETURN TO THE ER IMMEDIATELY IF YOU HAVE WORSENING SHORTNESS OF BREATH. SYMPTOMS USUALLY PEAK BETWEEN DAY 7-10.

## 2020-11-28 NOTE — ED PROVIDER NOTE - NS ED ROS FT
GENERAL: No fever and no chills  EENT: No sore throat and no dysphagia.  RESPIRATORY: No cough and no SOB.  GI: No abdo pain, N/V/D

## 2020-11-28 NOTE — ED PROVIDER NOTE - CLINICAL SUMMARY MEDICAL DECISION MAKING FREE TEXT BOX
COVID-19 swab obtained and sent to lab. Pt sitting comfortably in NAD. Will D/C and patient will F/U with results.

## 2020-11-28 NOTE — ED PROVIDER NOTE - OBJECTIVE STATEMENT
Pt presents to the ED requesting testing for COVID-19. Asymptomatic at this time with no other acute medical complaints currently. No fever, chills, cough, CP, SOB

## 2020-12-07 ENCOUNTER — EMERGENCY (EMERGENCY)
Facility: HOSPITAL | Age: 65
LOS: 1 days | Discharge: HOME | End: 2020-12-07
Admitting: EMERGENCY MEDICINE
Payer: COMMERCIAL

## 2020-12-07 VITALS
HEART RATE: 68 BPM | TEMPERATURE: 98 F | RESPIRATION RATE: 18 BRPM | HEIGHT: 66 IN | SYSTOLIC BLOOD PRESSURE: 119 MMHG | DIASTOLIC BLOOD PRESSURE: 77 MMHG | OXYGEN SATURATION: 99 %

## 2020-12-07 DIAGNOSIS — Z20.828 CONTACT WITH AND (SUSPECTED) EXPOSURE TO OTHER VIRAL COMMUNICABLE DISEASES: ICD-10-CM

## 2020-12-07 PROCEDURE — 99283 EMERGENCY DEPT VISIT LOW MDM: CPT

## 2020-12-07 NOTE — ED PROVIDER NOTE - OBJECTIVE STATEMENT
66 y/o female presents to the ED requesting COVID-19 testing. Patient is currently asymptomatic and has no other medical complaints at this time. Denies fever, chills, chest pain, SOB, cough.

## 2020-12-07 NOTE — ED PROVIDER NOTE - PATIENT PORTAL LINK FT
You can access the FollowMyHealth Patient Portal offered by [facility] by registering at the following website: [email or web address]. By joining [employer]’s FollowMyHealth portal, you will also be able to view your health information using other applications (apps) compatible with our system.

## 2020-12-07 NOTE — ED PROVIDER NOTE - NSFOLLOWUPINSTRUCTIONS_ED_ALL_ED_FT
IF YOU DO NOT GET YOUR RESULTS WITHIN 48 HOURS PLEASE CALL [phone].    IF YOUR RESULT COMES BACK POSITIVE:    1. STAY HOME for 14 DAYS  2. Minimize Human contact to ONLY ESSENTIAL  3. Every time you wash your hands, sing the HAPPY BIRTHDAY Song so you know you're washing long enough.  Make sure to scrub the webspace between your fingers.  4. DRINK 1-3 Liters of fluids day x at least 5 days.  To remain hydrated. Your fatigue, lightheadedness, and body aches will decrease and your fever has a better chance of breaking if you are well hydrated.    5. For your Fever and Body aches takes Tylenol 650-100mg every 4-6h (max 4000mg/day). Try not to use ibuprofen, aspirin or naproxen (Advil, Motrin or Aleve) as these may worsen Coronavirus infection.  6. RETURN TO THE ER IMMEDIATELY IF YOU HAVE WORSENING SHORTNESS OF BREATH. SYMPTOMS USUALLY PEAK BETWEEN DAY 7-10.

## 2021-01-12 ENCOUNTER — EMERGENCY (EMERGENCY)
Facility: HOSPITAL | Age: 66
LOS: 1 days | Discharge: HOME | End: 2021-01-12
Admitting: EMERGENCY MEDICINE
Payer: COMMERCIAL

## 2021-01-12 VITALS
SYSTOLIC BLOOD PRESSURE: 134 MMHG | HEIGHT: 66 IN | HEART RATE: 84 BPM | DIASTOLIC BLOOD PRESSURE: 76 MMHG | TEMPERATURE: 98 F | RESPIRATION RATE: 18 BRPM | OXYGEN SATURATION: 99 %

## 2021-01-12 DIAGNOSIS — Z20.822 CONTACT WITH AND (SUSPECTED) EXPOSURE TO COVID-19: ICD-10-CM

## 2021-01-12 PROCEDURE — 99283 EMERGENCY DEPT VISIT LOW MDM: CPT

## 2021-01-12 NOTE — ED PROVIDER NOTE - PATIENT PORTAL LINK FT
You can access the FollowMyHealth Patient Portal offered by [facility] by registering at the following website: [email or web address]. By joining [facility]’s FollowMyHealth portal, you will also be able to view your health information using other applications (apps) compatible with our system.

## 2021-01-12 NOTE — ED ADULT TRIAGE NOTE - CAS EDP DISCH TYPE
Last Visit: 4/2/19 with MD Leodan Contreras  Next Appointment: 8/19/19 with MD Leodan Contreras  Previous Refill Encounter(s): 3/27/19 #60 with 1 refill    Requested Prescriptions     Pending Prescriptions Disp Refills    levETIRAcetam (KEPPRA) 750 mg tablet 180 Tab 0     Sig: Take 1 Tab by mouth two (2) times a day. Home

## 2021-01-13 ENCOUNTER — TRANSCRIPTION ENCOUNTER (OUTPATIENT)
Age: 66
End: 2021-01-13

## 2021-01-13 LAB — SARS-COV-2 RNA SPEC QL NAA+PROBE: SIGNIFICANT CHANGE UP

## 2021-01-23 ENCOUNTER — EMERGENCY (EMERGENCY)
Facility: HOSPITAL | Age: 66
LOS: 1 days | Discharge: HOME | End: 2021-01-23
Admitting: EMERGENCY MEDICINE
Payer: COMMERCIAL

## 2021-01-23 VITALS
OXYGEN SATURATION: 95 % | HEIGHT: 66 IN | SYSTOLIC BLOOD PRESSURE: 148 MMHG | RESPIRATION RATE: 18 BRPM | TEMPERATURE: 99 F | DIASTOLIC BLOOD PRESSURE: 81 MMHG | HEART RATE: 77 BPM

## 2021-01-23 DIAGNOSIS — Z20.822 CONTACT WITH AND (SUSPECTED) EXPOSURE TO COVID-19: ICD-10-CM

## 2021-01-23 PROCEDURE — 99283 EMERGENCY DEPT VISIT LOW MDM: CPT

## 2021-01-23 NOTE — ED ADULT TRIAGE NOTE - CHIEF COMPLAINT QUOTE
Patient requesting COVID-19 testing; patient is currently asymptomatic with no recent travel. Last known exposure 1/12.

## 2021-01-23 NOTE — ED PROVIDER NOTE - NSFOLLOWUPINSTRUCTIONS_ED_ALL_ED_FT
THE COVID 19 TEST RESULTS  - results may take up to 2-3 days to become available   - if you have consented, you will receive your results electronically   -  you can check [facility] or call [phone] to discuss your results with our nursing staff    Please continue to self quarantine (home isolation) until your result is back and follow instructions accordingly  - positive: complete home isolation for a total of 14 days since day of testing and no more fever with feeling back to baseline   - negative: you will be able to stop home isolation but still practice standard precautions, similar to how you would manage a regular flu/cold.    Return to ER for any worsening symptoms, such as persistent fever >100.4F, shortness of breath, coughing up bloody sputum, chest pain, lethargy, and fainting    Please remember to wash your hands frequently (>20 seconds each time), avoid touching your face, and cover your cough/sneeze.  Always wear a mask when you are outside of your home and practice social distancing.    Only take Tylenol for fever/pain control and avoid NSAIDs (ibuprofen/Advil/Aleve/naproxen) due to potential increased risk of exacerbating COVID-19 infection

## 2021-01-23 NOTE — ED PROVIDER NOTE - PATIENT PORTAL LINK FT
You can access the FollowMyHealth Patient Portal offered by [facility] by registering at the following website: [email or web address]. By joining [employer]’s FollowMyHealth portal, you will also be able to view your health information using other applications (apps) compatible with our system.

## 2021-01-23 NOTE — ED PROVIDER NOTE - CLINICAL SUMMARY MEDICAL DECISION MAKING FREE TEXT BOX
Asymptomatic patient here for COVID screening. Risk of exposure is very low. Reviewed vitals and testing plan with the patient. Encouraged to download the follow my health lynn for test results.

## 2021-01-26 ENCOUNTER — NON-APPOINTMENT (OUTPATIENT)
Age: 66
End: 2021-01-26

## 2021-01-26 ENCOUNTER — APPOINTMENT (OUTPATIENT)
Dept: HEART AND VASCULAR | Facility: CLINIC | Age: 66
End: 2021-01-26
Payer: COMMERCIAL

## 2021-01-26 VITALS — SYSTOLIC BLOOD PRESSURE: 125 MMHG | DIASTOLIC BLOOD PRESSURE: 81 MMHG

## 2021-01-26 VITALS
SYSTOLIC BLOOD PRESSURE: 133 MMHG | BODY MASS INDEX: 34.23 KG/M2 | WEIGHT: 213 LBS | OXYGEN SATURATION: 95 % | HEIGHT: 66 IN | TEMPERATURE: 97.7 F | HEART RATE: 87 BPM | DIASTOLIC BLOOD PRESSURE: 79 MMHG

## 2021-01-26 PROCEDURE — 93000 ELECTROCARDIOGRAM COMPLETE: CPT

## 2021-01-26 PROCEDURE — 99214 OFFICE O/P EST MOD 30 MIN: CPT | Mod: 25

## 2021-01-26 PROCEDURE — 99072 ADDL SUPL MATRL&STAF TM PHE: CPT

## 2021-01-26 NOTE — REASON FOR VISIT
[Follow-Up - Clinic] : a clinic follow-up of [FreeTextEntry1] : Diagnostic Tests:\par --------------------------------------\par ECG:\par 01/26/21: NSR, single PVC. \par 02/25/19: NSR, normal ECG. \par 04/09/18: NSR, frequent APCs.\par 08/11/16: NSR, normal ECG.\par --------------------------------------\par Echo:\par 03/11/19: EF 58%, normal echo. \par 05/01/18: EF 63%, trace MR/TR. \par 08/04/16: EF 65%, grade I diastolic dysfunction, trace TR\par --------------------------------------\par CT:\par 08/03/16: head: normal\par 08/03/16: CTA head and neck: normal \par --------------------------------------\par MR:\par 08/04/16: brain: small left insula infarct.

## 2021-01-26 NOTE — REVIEW OF SYSTEMS
[Memory Lapses Or Loss] : memory lapses or loss [Depression] : depression [Negative] : Heme/Lymph [Heartburn] : heartburn [Joint Pain] : joint pain

## 2021-01-26 NOTE — PHYSICAL EXAM
[General Appearance - Well Developed] : well developed [Normal Appearance] : normal appearance [Well Groomed] : well groomed [General Appearance - Well Nourished] : well nourished [No Deformities] : no deformities [General Appearance - In No Acute Distress] : no acute distress [Normal Conjunctiva] : the conjunctiva exhibited no abnormalities [Eyelids - No Xanthelasma] : the eyelids demonstrated no xanthelasmas [Normal Oral Mucosa] : normal oral mucosa [No Oral Pallor] : no oral pallor [No Oral Cyanosis] : no oral cyanosis [Normal Jugular Venous A Waves Present] : normal jugular venous A waves present [Normal Jugular Venous V Waves Present] : normal jugular venous V waves present [No Jugular Venous Benson A Waves] : no jugular venous benson A waves [Respiration, Rhythm And Depth] : normal respiratory rhythm and effort [Exaggerated Use Of Accessory Muscles For Inspiration] : no accessory muscle use [Auscultation Breath Sounds / Voice Sounds] : lungs were clear to auscultation bilaterally [Heart Rate And Rhythm] : heart rate and rhythm were normal [Heart Sounds] : normal S1 and S2 [Abdomen Soft] : soft [Arterial Pulses Normal] : the arterial pulses were normal [Abdomen Tenderness] : non-tender [Abdomen Mass (___ Cm)] : no abdominal mass palpated [Abnormal Walk] : normal gait [Gait - Sufficient For Exercise Testing] : the gait was sufficient for exercise testing [Nail Clubbing] : no clubbing of the fingernails [Petechial Hemorrhages (___cm)] : no petechial hemorrhages [Cyanosis, Localized] : no localized cyanosis [Skin Color & Pigmentation] : normal skin color and pigmentation [] : no rash [No Venous Stasis] : no venous stasis [Skin Lesions] : no skin lesions [No Skin Ulcers] : no skin ulcer [No Xanthoma] : no  xanthoma was observed [Affect] : the affect was normal [Oriented To Time, Place, And Person] : oriented to person, place, and time [Mood] : the mood was normal [No Anxiety] : not feeling anxious [Normal Rate] : normal [Normal S2] : normal S2 [Normal S1] : normal S1 [No Murmur] : no murmurs heard [2+] : right 2+ [No Abnormalities] : the abdominal aorta was not enlarged and no bruit was heard [No Pitting Edema] : no pitting edema present [S3] : no S3 [S4] : no S4 [Right Carotid Bruit] : no bruit heard over the right carotid [Left Carotid Bruit] : no bruit heard over the left carotid [Right Femoral Bruit] : no bruit heard over the right femoral artery [Left Femoral Bruit] : no bruit heard over the left femoral artery

## 2021-01-26 NOTE — ASSESSMENT
[FreeTextEntry1] : 1. Cerebrovascular accident (CVA): CVA: small left insula (MCA) (08/03/16); symptoms resolved\par             - follow up with neurologist, Cheyenne Kilpatrick MD\par             - completed RESPECT-ESUS trial (ASA 100mg po daily vs. Pradaxa 150mg po bid), will continue ASA 81mg po daily. \par \par 2. 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 DM2:\par             - discussed with patient therapeutic lifestyle changes to improve glucose metabolism\par \par 4. Asthma \par             - continue albuterol prn\par \par 5. s/p Implantable loop recorder: placed 2016, no events interrogation to date\par             - continue follow up with device clinic and consider explantation\par \par 6. Hypercholesteremia \par             - continue Atorvastatin Calcium Tablet, 80 MG, 1 tablet, Orally, Once a day\par  \par 7. Depression: non-major: h/o SSRI use and psychiatry visits\par             - patient does not wish to see a psychiatrist or re-try an SSRI at this time. \par  \par 8. AYE: moderate (11/2019), not compliant with CPAP\par             - follow up with sleep medicine specialist, Edward Patel MD\par \par

## 2021-01-26 NOTE — HISTORY OF PRESENT ILLNESS
[FreeTextEntry1] : Ms. Dasilva presents for follow up and management of impaired fasting glucose, dyslipidemia, AYE, and left MCA CVA (08/03/16). She was admitted to [facility] on 08/03/16 after having sudden onset of left hand numbness and aphasia. The symptoms persisted for several hours. She had a CT head and CTA head an neck on 08/03/16 both of which were normal. She had a MR of the brain on 08/04/16 which revealed a small left insula infarct. She was evaluated by neurology, Cheyenne Kilpatrick MD and was diagnosed with a left MCA CVA. She also saw a heart rhythm doctor, Arie Kerns MD, and had an loop recorder implanted. She had an echocardiogram which revealed normal LV systolic function and no valvular heart disease. She had paroxysmal atrial tachycardia as a child. Since being discharged, she has not had recurrence of her symptoms. We had an extensive discussion about the embolic nature of her stroke and the likelihood that there may be occult atrial fibrillation. She has completed the RESPECT-ESUS research trial for patients with embolic CVA of undetermined source to study ASA vs. Pradaxa.  She denies palpitations or bleeding. Her implantable loop recorder interrogation today did not reveal any evidence of dysrhythmia.   She had followed with a psychiatrist in the past and was on an SSRI. We had an extensive discussion about strategies to treat her depression and, although she does not want to start seeing a psychiatrist again and is not willing to start an SSRI at this time. She feels better regarding her affective disorder.  She will be traveling to [place] in the near future.  She admits to dietary indiscretion with ice cream and has gained about 15 pounds as a result.  We had an extensive discussion about therapeutic lifestyle changes to promote increased cardiovascular fitness and achieving goal weight.  She is doing well during the COVID crisis.  Since her last visit she has been well with no CV symptoms. She has complaints of arthritis pain.

## 2021-02-10 ENCOUNTER — EMERGENCY (EMERGENCY)
Facility: HOSPITAL | Age: 66
LOS: 1 days | Discharge: HOME | End: 2021-02-10
Attending: EMERGENCY MEDICINE | Admitting: EMERGENCY MEDICINE
Payer: COMMERCIAL

## 2021-02-10 VITALS
RESPIRATION RATE: 18 BRPM | WEIGHT: 210.98 LBS | TEMPERATURE: 99 F | SYSTOLIC BLOOD PRESSURE: 138 MMHG | DIASTOLIC BLOOD PRESSURE: 73 MMHG | HEIGHT: 66 IN | OXYGEN SATURATION: 96 % | HEART RATE: 76 BPM

## 2021-02-10 DIAGNOSIS — R53.1 WEAKNESS: ICD-10-CM

## 2021-02-10 DIAGNOSIS — R53.83 OTHER FATIGUE: ICD-10-CM

## 2021-02-10 DIAGNOSIS — R53.81 OTHER MALAISE: ICD-10-CM

## 2021-02-10 DIAGNOSIS — Z20.822 CONTACT WITH AND (SUSPECTED) EXPOSURE TO COVID-19: ICD-10-CM

## 2021-02-10 LAB — SARS-COV-2 RNA SPEC QL NAA+PROBE: SIGNIFICANT CHANGE UP

## 2021-02-10 PROCEDURE — 99284 EMERGENCY DEPT VISIT MOD MDM: CPT

## 2021-02-10 PROCEDURE — 93010 ELECTROCARDIOGRAM REPORT: CPT

## 2021-02-10 NOTE — ED ADULT NURSE NOTE - OBJECTIVE STATEMENT
67 y/o F c/o fatigue since Sunday. Pt requesting to be tested for COVID. Pt denies any additional s/s.

## 2021-02-10 NOTE — ED PROVIDER NOTE - PATIENT PORTAL LINK FT
You can access the FollowMyHealth Patient Portal offered by [facility] by registering at the following website: [email or web address]. By joining [employer]’s FollowMyHealth portal, you will also be able to view your health information using other applications (apps) compatible with our system.

## 2021-02-10 NOTE — ED PROVIDER NOTE - OBJECTIVE STATEMENT
65 y/o F presents to the ED requesting to have testing done for COVID-19. Pt endorses she is asymptomatic at this time. Pt denies fevers, chills, coughs, CP, and SOB.
66 F here for a COVID test- she has some mild sinus congestion and is more fatigues. NO dizziness or CP or SOB. No recent exposures. Had some mild asthma like sx last night. Sees a pulmonologist.

## 2021-02-10 NOTE — ED PROVIDER NOTE - CLINICAL SUMMARY MEDICAL DECISION MAKING FREE TEXT BOX
Pt presents to the ED requesting to have screening testing done for COVID-19. Pt endorses she is asymptomatic at this time. On exam, Pt appears well and in no apparent distress. No vital sign derangements. No conversational dyspnea. Nasopharyngeal PCR has been obtained and Pt has been guided on how to obtain their results. General COVID-19 discharge instructions have been given and Pt agrees to receiving results electronically.
Patient is presenting to the Emergency Department and requesting a COVID-19 test.  Patient denies any major symptoms, has an unremarkable focused exam and looks well.  Nasopharyngeal PCR has been obtained and patient has been guided on how to obtain their results.  General COVID-19 discharge instructions have been given to the patient.

## 2021-02-10 NOTE — ED PROVIDER NOTE - PHYSICAL EXAMINATION
VITAL SIGNS: I have reviewed nursing notes and confirm.  CONSTITUTIONAL: Well-developed; well-nourished; in no acute distress.  SKIN: Skin is warm and dry, no acute rash.  HEAD: Normocephalic; atraumatic.  EYES: Pupils round bilaterally, 3mm; conjunctiva and sclera clear.  ENT: No nasal discharge; airway clear, MMM.  CARD: S1, S2 normal; no murmurs, gallops, or rubs. Regular rate and rhythm.  RESP: No wheezes, rales or rhonchi.  : No CVAT tenderness bilaterally   ABD: Soft; non-distended; non-tender; no rebound or guarding.  EXT: Normal ROM. No clubbing, cyanosis or edema.  NEURO: Alert, oriented. Grossly unremarkable. CANTRELL, normal tone, no gross motor or sensory changes. Fluent speech.   PSYCH: Cooperative, appropriate. Mood and affect wnl.

## 2021-02-10 NOTE — ED PROVIDER NOTE - NSFOLLOWUPINSTRUCTIONS_ED_ALL_ED_FT
Your test results may take 1-3 days. You will get a text/email.  Please check the patient online portal for results. Please follow the instructions on provided coronavirus discharge educational forms and if needed self quarantine for 14 days.     If you test positive for COVID-19:     1. STAY HOME for 10 DAYS. If you live in a one bedroom with other people, please stay in your room for the entire 10 days. Have people bring food to you. When you use the bathroom, wipe down all of the faucets (etc.) before others use them.   2. Minimize Human contact to ONLY ESSENTIAL  3. Wash your hands for 20 seconds. Make sure to scrub the webspace between your fingers.  4. DRINK 1-3 Liters of fluids day x at least 5 days.  To remain hydrated. Your fatigue, lightheadedness, and body aches will decrease and your fever has a better chance of breaking if you are well hydrated.    5. For your Fever and Body aches takes Tylenol 650-100mg every 4-6h (max 4000mg/day) or Motrin.  6. Use cough syrup or an inhaler for mild shortness of breath and cough. Try to purchase a pulse oximeter if you are having shortness of breath.   7. RETURN TO THE ER IMMEDIATELY IF YOUR OXYGEN IS LESS THAN 93% OR IF YOU ARE HAVING WORSENING SHORTNESS OF BREATH  8. TAKE THE FOLLOWING SUPPLEMENTS DAILY.        VITAMIN C 1000MG ONCE DAILY.        VITAMIN D 200IU ONCE DAILY.        ZINC 50MG ONCE DAILY.
Your test results may take 1-3 days. You will get a text/email.  Please check the patient online portal (Joanne and website) for results. You can create a portal account at [email or web address] [facility]. If you have old records with [facility] or [facility]  or encounter any difficulties with us you will need to call the HELP line to merge results [phone] (Mon-Fri 8a-5p).    Please follow the instructions on provided coronavirus discharge educational forms and if needed self quarantine for 14 days.     If you test positive for COVID 19:    1. STAY HOME for 14 DAYS  2. Minimize Human contact to ONLY ESSENTIAL  3. Every time you wash your hands, sing the HAPPY BIRTHDAY Song so you know you're washing long enough.  Make sure to scrub the webspace between your fingers.  4. DRINK 1-3 Liters of fluids day x at least 5 days.  To remain hydrated. Your fatigue, lightheadedness, and body aches will decrease and your fever has a better chance of breaking if you are well hydrated.    5. For your Fever and Body aches takes Tylenol 650-100mg every 4-6h (max 4000mg/day). Try not to use ibuprofen, aspirin or naproxen (Advil, Motrin or Aleve) as these may worsen Coronavirus infection.  6. Use an inhaler for mild shortness of breath and cough  7. RETURN TO THE ER IMMEDIATELY IF YOU HAVE WORSENING SHORTNESS OF BREATH  8. TAKE THE FOLLOWING SUPPLEMENTS DAILY.        VITAMIN C 1000MG ONCE DAILY.        VITAMIN D 200IU ONCE DAILY.        ZINC 50MG ONCE DAILY.

## 2021-08-17 ENCOUNTER — APPOINTMENT (OUTPATIENT)
Dept: RHEUMATOLOGY | Facility: CLINIC | Age: 66
End: 2021-08-17
Payer: COMMERCIAL

## 2021-08-17 ENCOUNTER — NON-APPOINTMENT (OUTPATIENT)
Age: 66
End: 2021-08-17

## 2021-08-17 VITALS
DIASTOLIC BLOOD PRESSURE: 73 MMHG | HEART RATE: 93 BPM | TEMPERATURE: 97.5 F | OXYGEN SATURATION: 95 % | BODY MASS INDEX: 33.27 KG/M2 | HEIGHT: 66 IN | SYSTOLIC BLOOD PRESSURE: 114 MMHG | WEIGHT: 207 LBS

## 2021-08-17 PROCEDURE — 99214 OFFICE O/P EST MOD 30 MIN: CPT

## 2021-08-17 NOTE — HISTORY OF PRESENT ILLNESS
[___ Month(s) Ago] : [unfilled] month(s) ago [Cough] : cough [Arthralgias] : arthralgias [Joint Deformity] : joint deformity [Decreased ROM] : decreased range of motion [Muscle Spasms] : muscle spasms [FreeTextEntry1] : Follow up: 8/17/21 \par \par 67 y/o F with hand OA and other poly OA affecting toes and likely L spine. \par She has b/l  2nd toe flexion, only can use certain shoes. \par Medial epicondylitis of left  elbow: resolved \par Had essentially normal labs, hand  Xray  reviewed with pt and suggestive hand OA, worse affecting  left 5th DIP where pt has most symptoms. \par DEXA-osteopenia. \par \par \par Follow up: 8/18/20 \par \par 66 y/o F with hand OA\par Medial epicondylitis of left  elbow: resolved \par Had essentially normal labs, hand  Xray  reviewed with pt and suggestive hand OA, worse affecting  left 5th DIP where pt has most symptoms. \par chronic R LE swelling, US negative for DVT\par DXA-osteopenia \par \par \par \par Follow up: 5/18/20\par right 5th finger mallet: likely secondary to OA, has not used splint as recommended.\par Medial epicondylitis of left  elbow: resolved \par Had essentially normal labs, hand  Xray  reviewed with pt and suggestive hand OA, worse affecting  left 5th DIP where pt has most symptoms. \par chronic R LE swelling, she has not done US as recommended during initial visit to rule out DVT>\par \par \par Self Referred for Rheumatology consultation \par PMD: Trish Laguerre ( [facility]) \par \par \par 66 y/o very pleasant F with dyslipidemia, AYE, left CVA ( 08/03/16) on ASA , Asthma ( recently stopped  inhalers as normal PFTS) , laryngopharyngeal reflux\par R > left 5th finger DIP pain and flexion, states started around fall time, does not remember any inciting event or trauma. Extension is very painful. Denies weakness or numbness sensation. \par She has hx of left elbow lateral epicondytitis diagnosed in 2016, saw Ortho and recommended to stop gardening which she did and symptoms improved.  \par For past few weeks recurrence of medial elbow pain worse during ROM. \par Hx of R plantar fasciitis and has recurrence since November, gradually improving, has  not done PT this time or injection.\par She was on Vacation in [place]  ([place]ayed [place] and  [place]ited [place]. \par ON return last Monday she had low grade fever for 2 days  99.9  and non bloody  diarrhea resolved 1 day after took Imodium. \par Since return R LE is more swollen, denies hx of DVT/VTE\par \par No h/o morning stiffness, memory loss, patchy hair loss, sicca symptoms, photosensitivity, HA,  Raynaud's, oral ulcers, nasal ulcers. \par Personal or family hx of autoimmune disease, no hx of psoriasis\par Grandmother RA. \par Osteoporosis screen: not sure, perhaps long time ago. \par LMP at age 48, no fracture. \par \par  [Anorexia] : no anorexia [Weight Loss] : no weight loss [Malaise] : no malaise [Fever] : no fever [Chills] : no chills [Fatigue] : no fatigue [Depression] : no depression [Malar Facial Rash] : no malar facial rash [Skin Lesions] : no lesions [Skin Nodules] : no skin nodules [Oral Ulcers] : no oral ulcers [Dry Mouth] : no dry mouth [Dysphonia] : no dysphonia [Dysphagia] : no dysphagia [Shortness of Breath] : no shortness of breath [Chest Pain] : no chest pain [Joint Swelling] : no joint swelling [Joint Warmth] : no joint warmth [Morning Stiffness] : no morning stiffness [Falls] : no falls [Dyspnea] : no dyspnea [Myalgias] : no myalgias [Muscle Weakness] : no muscle weakness [Muscle Cramping] : no muscle cramping [Visual Changes] : no visual changes [Eye Pain] : no eye pain [Dry Eyes] : no dry eyes

## 2021-08-17 NOTE — ASSESSMENT
[FreeTextEntry1] : 67 y/o very pleasant F with dyslipidemia, AYE, left CVA ( 08/03/16) on ASA , Asthma , laryngopharyngeal reflux, hx of plantar fasciitis and lateral epicondylitis diagnosed in 2016, resolved. \par She presented on 2/2020  with R 5th finger DIP flexion deformity without any inciting event or trauma, also left elbow pain. \par On exam she was found to have medical epicondylitis of left elbow and R>L 5th DIP joint flexion painful reducible deformity , also other PIP and DIP joint bony proliferation suggestive of hand OA and Xray has been confirmed OA. \par She has normal Rheum labs and no evidence of inflammatory arthritis ( lack of constitutional symptoms,  prolonged AM stiffness, active synovitis)\par \par \par \par 1. Hand OA and other poly OA\par can use arthritis glove, OTC diclofenac gel prn \par Recommended exercise and healthy lifestyle. \par \par 2. Mallet Finger of R 5th DIP: can't recall any trauma,  secondary to OA/osteophytes \par avoid hyperextension \par recommended extension splinting of DIP joint for 6-8 weeks, she has not done it. \par May consider to refer to hand Ortho in the future. \par \par 3. Bone Health: \par DEXA osteopenia \par Calcium 1200 mg daily from diet and supplements (to be taken in divided doses as no more than 500-600 mg can be absorbed at one time)\par Continue current vitamin D regimen\par Diet, exercise and fall prevention discussed. \par Need to repeat DXA in 2-3 years from 2020. \par \par \par f/u prn \par \par

## 2021-08-17 NOTE — REVIEW OF SYSTEMS
[Lower Ext Edema] : lower extremity edema [Cough] : cough [Arthralgias] : arthralgias [Joint Pain] : joint pain [Fever] : no fever [Chills] : no chills [Feeling Poorly] : not feeling poorly [Feeling Tired] : not feeling tired [Recent Weight Gain (___ Lbs)] : no recent weight gain [Recent Weight Loss (___ Lbs)] : no recent weight loss [Eye Pain] : no eye pain [Red Eyes] : eyes not red [Eyesight Problems] : no eyesight problems [Earache] : no earache [Sore Throat] : no sore throat [Heart Rate Is Slow] : the heart rate was not slow [Chest Pain] : no chest pain [Palpitations] : no palpitations [Leg Claudication] : no intermittent leg claudication [Shortness Of Breath] : no shortness of breath [Wheezing] : no wheezing [Orthopnea] : no orthopnea [PND] : no PND [Heartburn] : no heartburn [Joint Swelling] : no joint swelling [Joint Stiffness] : no joint stiffness [Itching] : no itching [Easy Bleeding] : no tendency for easy bleeding [Easy Bruising] : no tendency for easy bruising [Swollen Glands] : no swollen glands [Swollen Glands In The Neck] : no swollen glands in the neck

## 2021-08-17 NOTE — PHYSICAL EXAM
[General Appearance - Alert] : alert [General Appearance - In No Acute Distress] : in no acute distress [General Appearance - Well Nourished] : well nourished [Sclera] : the sclera and conjunctiva were normal [Respiration, Rhythm And Depth] : normal respiratory rhythm and effort [Heart Rate And Rhythm] : heart rate was normal and rhythm regular [Murmurs] : no murmurs [Heart Sounds Pericardial Friction Rub] : no pericardial rub [Abnormal Walk] : normal gait [Nail Clubbing] : no clubbing  or cyanosis of the fingernails [Motor Tone] : muscle strength and tone were normal [Skin Color & Pigmentation] : normal skin color and pigmentation [] : no rash [Skin Lesions] : no skin lesions [Oriented To Time, Place, And Person] : oriented to person, place, and time [Impaired Insight] : insight and judgment were intact [Affect] : the affect was normal [Exaggerated Use Of Accessory Muscles For Inspiration] : no accessory muscle use [FreeTextEntry1] : Heberden's and drea's nodes. R 5th DIP reducible flexion deformity, tender, bony proliferation of multiple DIP/PIP joints, lesser degree left 5th DIP flexion/reducible, b/l toe flexion deformity

## 2021-10-06 ENCOUNTER — RX RENEWAL (OUTPATIENT)
Age: 66
End: 2021-10-06

## 2021-10-07 PROBLEM — I63.9 CEREBRAL INFARCTION, UNSPECIFIED: Chronic | Status: ACTIVE | Noted: 2021-02-10

## 2021-11-23 ENCOUNTER — APPOINTMENT (OUTPATIENT)
Dept: HEART AND VASCULAR | Facility: CLINIC | Age: 66
End: 2021-11-23
Payer: COMMERCIAL

## 2021-11-23 ENCOUNTER — NON-APPOINTMENT (OUTPATIENT)
Age: 66
End: 2021-11-23

## 2021-11-23 VITALS
SYSTOLIC BLOOD PRESSURE: 106 MMHG | HEART RATE: 82 BPM | DIASTOLIC BLOOD PRESSURE: 66 MMHG | OXYGEN SATURATION: 97 % | TEMPERATURE: 96.6 F | WEIGHT: 192 LBS | BODY MASS INDEX: 30.86 KG/M2 | HEIGHT: 66 IN

## 2021-11-23 PROCEDURE — 93000 ELECTROCARDIOGRAM COMPLETE: CPT

## 2021-11-23 PROCEDURE — 99214 OFFICE O/P EST MOD 30 MIN: CPT | Mod: 25

## 2021-11-23 NOTE — HISTORY OF PRESENT ILLNESS
[FreeTextEntry1] : Ms. Dasilva presents for follow up and management of impaired fasting glucose, dyslipidemia, AYE, and left MCA CVA (08/03/16). She was admitted to [facility] on 08/03/16 after having sudden onset of left hand numbness and aphasia. The symptoms persisted for several hours. She had a CT head and CTA head an neck on 08/03/16 both of which were normal. She had a MR of the brain on 08/04/16 which revealed a small left insula infarct. She was evaluated by neurology, Cheyenne Kilpatrick MD and was diagnosed with a left MCA CVA. She also saw a heart rhythm doctor, Arie Kerns MD, and had an loop recorder implanted. She had an echocardiogram which revealed normal LV systolic function and no valvular heart disease. She had paroxysmal atrial tachycardia as a child. Since being discharged, she has not had recurrence of her symptoms. We had an extensive discussion about the embolic nature of her stroke and the likelihood that there may be occult atrial fibrillation. She has completed the RESPECT-ESUS research trial for patients with embolic CVA of undetermined source to study ASA vs. Pradaxa.  She denies palpitations or bleeding. Her implantable loop recorder interrogation today did not reveal any evidence of dysrhythmia.   She had followed with a psychiatrist in the past and was on an SSRI. We had an extensive discussion about strategies to treat her depression and, although she does not want to start seeing a psychiatrist again and is not willing to start an SSRI at this time. She feels better regarding her affective disorder.  She will be traveling to [place] in the near future.  She admits to dietary indiscretion with ice cream and has gained about 15 pounds as a result.  We had an extensive discussion about therapeutic lifestyle changes to promote increased cardiovascular fitness and achieving goal weight.  She recently transferred from [facility] to the [facility] and has markedly decreased work related stress as a result.  She continues to loose weight through healthy lifestyle modifications.

## 2021-11-23 NOTE — PHYSICAL EXAM
[General Appearance - Well Developed] : well developed [Normal Appearance] : normal appearance [Well Groomed] : well groomed [General Appearance - Well Nourished] : well nourished [No Deformities] : no deformities [General Appearance - In No Acute Distress] : no acute distress [Normal Conjunctiva] : the conjunctiva exhibited no abnormalities [Eyelids - No Xanthelasma] : the eyelids demonstrated no xanthelasmas [Normal Oral Mucosa] : normal oral mucosa [No Oral Pallor] : no oral pallor [No Oral Cyanosis] : no oral cyanosis [Normal Jugular Venous A Waves Present] : normal jugular venous A waves present [Normal Jugular Venous V Waves Present] : normal jugular venous V waves present [No Jugular Venous Benson A Waves] : no jugular venous benson A waves [Respiration, Rhythm And Depth] : normal respiratory rhythm and effort [Exaggerated Use Of Accessory Muscles For Inspiration] : no accessory muscle use [Auscultation Breath Sounds / Voice Sounds] : lungs were clear to auscultation bilaterally [Heart Rate And Rhythm] : heart rate and rhythm were normal [Heart Sounds] : normal S1 and S2 [Arterial Pulses Normal] : the arterial pulses were normal [Abdomen Soft] : soft [Abdomen Tenderness] : non-tender [Abdomen Mass (___ Cm)] : no abdominal mass palpated [Abnormal Walk] : normal gait [Gait - Sufficient For Exercise Testing] : the gait was sufficient for exercise testing [Nail Clubbing] : no clubbing of the fingernails [Cyanosis, Localized] : no localized cyanosis [Petechial Hemorrhages (___cm)] : no petechial hemorrhages [Skin Color & Pigmentation] : normal skin color and pigmentation [] : no rash [No Venous Stasis] : no venous stasis [Skin Lesions] : no skin lesions [No Skin Ulcers] : no skin ulcer [No Xanthoma] : no  xanthoma was observed [Oriented To Time, Place, And Person] : oriented to person, place, and time [Affect] : the affect was normal [Mood] : the mood was normal [No Anxiety] : not feeling anxious [Normal Rate] : normal [Normal S1] : normal S1 [Normal S2] : normal S2 [No Murmur] : no murmurs heard [2+] : left 2+ [No Abnormalities] : the abdominal aorta was not enlarged and no bruit was heard [No Pitting Edema] : no pitting edema present [S3] : no S3 [S4] : no S4 [Right Carotid Bruit] : no bruit heard over the right carotid [Left Carotid Bruit] : no bruit heard over the left carotid [Right Femoral Bruit] : no bruit heard over the right femoral artery [Left Femoral Bruit] : no bruit heard over the left femoral artery

## 2021-11-23 NOTE — ASSESSMENT
[FreeTextEntry1] : 1. Cerebrovascular accident (CVA): CVA: small left insula (MCA) (08/03/16); symptoms resolved\par             - follow up with neurologist, Cheyenne Kilpatrick MD\par             - completed RESPECT-ESUS trial (ASA 100mg po daily vs. Pradaxa 150mg po bid)\par             - continue ASA 81mg po daily \par \par 2. 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 DM2: A1c 7.0 (10/18/21)\par            - discussed with patient therapeutic lifestyle changes to improve glucose metabolism\par            - recheck A1c next visit post trial of therapeutic lifestyle changes             \par \par 4. Dyslipidemia: LDL 99 (10/18/21)\par            - continue atorvastatin 80mg po qd\par            - discussed therapeutic lifestyle changes to promote improved lipid metabolism \par  \par 7. Depression: non-major: h/o SSRI use and psychiatry visits\par             - patient does not wish to see a psychiatrist or re-try an SSRI at this time. \par  \par 8. AYE: moderate (11/2019), not compliant with CPAP\par             - follow up with sleep medicine specialist, Edward Patel MD\par \par \par An ECG was obtained to evaluate heart rhythm and screen for any underlying cardiac abnormalities. \par \par

## 2021-11-23 NOTE — REASON FOR VISIT
[Other: ____] : [unfilled] [FreeTextEntry1] : Diagnostic Tests:\par --------------------------------------\par ECG:\par 11/23/21: sinus rhythm, normal ECG. \par 01/26/21: NSR, single PVC. \par 02/25/19: NSR, normal ECG. \par 04/09/18: NSR, frequent APCs.\par 08/11/16: NSR, normal ECG.\par --------------------------------------\par Echo:\par 03/11/19: EF 58%, normal echo. \par 05/01/18: EF 63%, trace MR/TR. \par 08/04/16: EF 65%, grade I diastolic dysfunction, trace TR\par --------------------------------------\par CT:\par 08/03/16: head: normal\par 08/03/16: CTA head and neck: normal \par --------------------------------------\par MR:\par 08/04/16: brain: small left insula infarct.

## 2022-02-04 ENCOUNTER — APPOINTMENT (OUTPATIENT)
Dept: RHEUMATOLOGY | Facility: CLINIC | Age: 67
End: 2022-02-04
Payer: COMMERCIAL

## 2022-02-04 VITALS
BODY MASS INDEX: 27.32 KG/M2 | HEART RATE: 89 BPM | OXYGEN SATURATION: 97 % | TEMPERATURE: 97.3 F | SYSTOLIC BLOOD PRESSURE: 112 MMHG | WEIGHT: 170 LBS | DIASTOLIC BLOOD PRESSURE: 74 MMHG | HEIGHT: 66 IN

## 2022-02-04 PROCEDURE — 99214 OFFICE O/P EST MOD 30 MIN: CPT

## 2022-02-04 NOTE — HISTORY OF PRESENT ILLNESS
[Cough] : cough [Arthralgias] : arthralgias [Joint Deformity] : joint deformity [Decreased ROM] : decreased range of motion [Muscle Spasms] : muscle spasms [___ Month(s) Ago] : [unfilled] month(s) ago [FreeTextEntry1] : Follow up: 2/4/22 \par 66 y/o F with hand OA and other poly OA affecting toes and likely L spine. \par She has not been using arthritis glove or topical diclofenac gel as advised during last visit. Thinks finger deformity has been getting worse. \par Osteopenia: on calcium and vit D supplements \par \par Follow up: 8/17/21 \par \par 65 y/o F with hand OA and other poly OA affecting toes and likely L spine. \par She has b/l  2nd toe flexion, only can use certain shoes. \par Medial epicondylitis of left  elbow: resolved \par Had essentially normal labs, hand  Xray  reviewed with pt and suggestive hand OA, worse affecting  left 5th DIP where pt has most symptoms. \par DEXA-osteopenia. \par \par \par Follow up: 8/18/20 \par \par 64 y/o F with hand OA\par Medial epicondylitis of left  elbow: resolved \par Had essentially normal labs, hand  Xray  reviewed with pt and suggestive hand OA, worse affecting  left 5th DIP where pt has most symptoms. \par chronic R LE swelling, US negative for DVT\par DXA-osteopenia \par \par \par \par Follow up: 5/18/20\par right 5th finger mallet: likely secondary to OA, has not used splint as recommended.\par Medial epicondylitis of left  elbow: resolved \par Had essentially normal labs, hand  Xray  reviewed with pt and suggestive hand OA, worse affecting  left 5th DIP where pt has most symptoms. \par chronic R LE swelling, she has not done US as recommended during initial visit to rule out DVT>\par \par \par Self Referred for Rheumatology consultation \par PMD: Trish Laguerre ( [facility]) \par \par \par 64 y/o very pleasant F with dyslipidemia, AYE, left CVA ( 08/03/16) on ASA , Asthma ( recently stopped  inhalers as normal PFTS) , laryngopharyngeal reflux\par R > left 5th finger DIP pain and flexion, states started around fall time, does not remember any inciting event or trauma. Extension is very painful. Denies weakness or numbness sensation. \par She has hx of left elbow lateral epicondytitis diagnosed in 2016, saw Ortho and recommended to stop gardening which she did and symptoms improved.  \par For past few weeks recurrence of medial elbow pain worse during ROM. \par Hx of R plantar fasciitis and has recurrence since November, gradually improving, has  not done PT this time or injection.\par She was on Vacation in [place]  ([place]ayed [place] and  visited [place]. \par ON return last Monday she had low grade fever for 2 days  99.9  and non bloody  diarrhea resolved 1 day after took Imodium. \par Since return R LE is more swollen, denies hx of DVT/VTE\par \par No h/o morning stiffness, memory loss, patchy hair loss, sicca symptoms, photosensitivity, HA,  Raynaud's, oral ulcers, nasal ulcers. \par Personal or family hx of autoimmune disease, no hx of psoriasis\par Grandmother RA. \par Osteoporosis screen: not sure, perhaps long time ago. \par LMP at age 48, no fracture. \par \par  [Anorexia] : no anorexia [Weight Loss] : no weight loss [Malaise] : no malaise [Fever] : no fever [Chills] : no chills [Fatigue] : no fatigue [Depression] : no depression [Malar Facial Rash] : no malar facial rash [Skin Lesions] : no lesions [Skin Nodules] : no skin nodules [Oral Ulcers] : no oral ulcers [Dry Mouth] : no dry mouth [Dysphonia] : no dysphonia [Dysphagia] : no dysphagia [Shortness of Breath] : no shortness of breath [Chest Pain] : no chest pain [Joint Swelling] : no joint swelling [Joint Warmth] : no joint warmth [Morning Stiffness] : no morning stiffness [Falls] : no falls [Dyspnea] : no dyspnea [Myalgias] : no myalgias [Muscle Weakness] : no muscle weakness [Muscle Cramping] : no muscle cramping [Visual Changes] : no visual changes [Eye Pain] : no eye pain [Dry Eyes] : no dry eyes

## 2022-02-04 NOTE — PHYSICAL EXAM
[General Appearance - Alert] : alert [General Appearance - In No Acute Distress] : in no acute distress [General Appearance - Well Nourished] : well nourished [Sclera] : the sclera and conjunctiva were normal [Respiration, Rhythm And Depth] : normal respiratory rhythm and effort [Heart Rate And Rhythm] : heart rate was normal and rhythm regular [Abnormal Walk] : normal gait [Nail Clubbing] : no clubbing  or cyanosis of the fingernails [Motor Tone] : muscle strength and tone were normal [Skin Color & Pigmentation] : normal skin color and pigmentation [] : no rash [Skin Lesions] : no skin lesions [Oriented To Time, Place, And Person] : oriented to person, place, and time [Impaired Insight] : insight and judgment were intact [Exaggerated Use Of Accessory Muscles For Inspiration] : no accessory muscle use [FreeTextEntry1] : Heberden's and drea's nodes. R 5th DIP reducible flexion deformity, tender, bony proliferation of multiple DIP/PIP joints, lesser degree left 5th DIP flexion/reducible, b/l toe flexion deformity

## 2022-02-04 NOTE — ASSESSMENT
[FreeTextEntry1] : 68 y/o very pleasant F with dyslipidemia, AYE, left CVA ( 08/03/16) on ASA , Asthma , laryngopharyngeal reflux, hx of plantar fasciitis and lateral epicondylitis diagnosed in 2016, resolved. \par She presented on 2/2020  with R 5th finger DIP flexion deformity without any inciting event or trauma, also left elbow pain. \par On exam she was found to have medical epicondylitis of left elbow and R>L 5th DIP joint flexion painful reducible deformity , also other PIP and DIP joint bony proliferation suggestive of hand OA and Xray has been confirmed OA. \par She has normal Rheum labs and no evidence of inflammatory arthritis ( lack of constitutional symptoms,  prolonged AM stiffness, active synovitis)\par \par \par 1. Hand OA and other poly OA: Hand OA progressed with mostly finger pain,  advised again today to  use arthritis glove, OTC diclofenac gel prn \par Recommended exercise and healthy lifestyle. \par Will refer to OT and hand Ortho if any additional injection or surgery indicated.  \par \par 2. Mallet Finger of R 5th DIP: can't recall any trauma,  secondary to OA/osteophytes \par avoid hyperextension \par recommended extension splinting of DIP joint for 6-8 weeks, she has not done it. \par \par 3. Bone Health: \par DEXA done in May, 2020 suggests  osteopenia \par She takes daily calcium and vit D \par Need to repeat DXA in 2023.  \par \par \par \par

## 2022-06-01 ENCOUNTER — APPOINTMENT (OUTPATIENT)
Dept: PULMONOLOGY | Facility: CLINIC | Age: 67
End: 2022-06-01
Payer: COMMERCIAL

## 2022-06-01 VITALS
SYSTOLIC BLOOD PRESSURE: 128 MMHG | DIASTOLIC BLOOD PRESSURE: 80 MMHG | BODY MASS INDEX: 27.32 KG/M2 | TEMPERATURE: 97.6 F | WEIGHT: 170 LBS | HEART RATE: 76 BPM | OXYGEN SATURATION: 98 % | HEIGHT: 66 IN

## 2022-06-01 PROCEDURE — 99214 OFFICE O/P EST MOD 30 MIN: CPT

## 2022-06-03 NOTE — PHYSICAL EXAM
[General Appearance - Well Developed] : well developed [Normal Appearance] : normal appearance [Well Groomed] : well groomed [General Appearance - Well Nourished] : well nourished [No Deformities] : no deformities [General Appearance - In No Acute Distress] : no acute distress [Normal Conjunctiva] : the conjunctiva exhibited no abnormalities [Normal Oropharynx] : normal oropharynx [Neck Appearance] : the appearance of the neck was normal [Apical Impulse] : the apical impulse was normal [Heart Rate And Rhythm] : heart rate was normal and rhythm regular [] : no respiratory distress [Respiration, Rhythm And Depth] : normal respiratory rhythm and effort [Exaggerated Use Of Accessory Muscles For Inspiration] : no accessory muscle use [Auscultation Breath Sounds / Voice Sounds] : lungs were clear to auscultation bilaterally [Abnormal Walk] : normal gait [Nail Clubbing] : no clubbing of the fingernails [Oriented To Time, Place, And Person] : oriented to person, place, and time [Impaired Insight] : insight and judgment were intact [Affect] : the affect was normal [Mood] : the mood was normal

## 2022-06-03 NOTE — REVIEW OF SYSTEMS
[EDS: ESS=____] : daytime somnolence: ESS=[unfilled] [Obesity] : obesity [Negative] : Psychiatric [Difficulty Initiating Sleep] : no difficulty falling asleep [Difficulty Maintaining Sleep] : no difficulty maintaining sleep [Lower Extremity Discomfort] : no lower extremity discomfort [Late day/ Evening symptoms] : no late day/evening symptoms [Unusual Sleep Behavior] : no unusual sleep behavior [Hypersomnolence] : not sleeping much more than usual [Cataplexy] :  no cataplexy

## 2022-06-03 NOTE — HISTORY OF PRESENT ILLNESS
[FreeTextEntry1] : 64y with long standing AYE on CPAP, ?childhood asthma referred for AYE management. Last sleep study was aobut 1 year ago; severity is unclear but she did have low oxygen saturations. Study was overnight; not available for review. Has a new machine; about 1 year old. Does not perceive a subjective benefit despite use. Use is not very consistent but even when it is she is still not endorsing great benefit. Total sleep time is around hours per night. DME is [facility]. She does not know the severity of her AYE when she was initially diagnosed about 10 years ago. \par \par 6/1/2022\par Has not followed up since 2019. Has since lost over 50 lbs. Sleeping is improved. Has stopped PAP use. [ESS] : 3

## 2022-06-03 NOTE — ASSESSMENT
[FreeTextEntry1] : HST from 3/30/2018 reviewed:\par *.5 minutes\par *AHI 23 events/hour (4%)\par *Prince oxygen saturation of 72%\par \par \par 64y with long standing AYE on CPAP, ?childhood asthma following up for AYE. Hx of moderate AYE. Has lost over 50lbs and has stopped PAP use since 2019.  Sleeping patterns have improved. Will repeat HST to ensure reduction in AHI. Referred to the [facility]. Follow up after HST. \par

## 2022-06-03 NOTE — CONSULT LETTER
[Dear  ___] : Dear  [unfilled], [Courtesy Letter:] : I had the pleasure of seeing your patient, [unfilled], in my office today. [Please see my note below.] : Please see my note below. [Consult Closing:] : Thank you very much for allowing me to participate in the care of this patient.  If you have any questions, please do not hesitate to contact me. [Sincerely,] : Sincerely, [FreeTextEntry3] : Vero Patel MD\par \par [facility] [employer] [facility]\par Pulmonary, Critical Care, and Sleep Medicine\par

## 2022-09-19 ENCOUNTER — APPOINTMENT (OUTPATIENT)
Dept: SLEEP CENTER | Facility: HOME HEALTH | Age: 67
End: 2022-09-19

## 2022-09-19 ENCOUNTER — OUTPATIENT (OUTPATIENT)
Dept: OUTPATIENT SERVICES | Facility: HOSPITAL | Age: 67
LOS: 1 days | End: 2022-09-19
Payer: COMMERCIAL

## 2022-09-19 PROCEDURE — 95800 SLP STDY UNATTENDED: CPT

## 2022-09-19 PROCEDURE — 95800 SLP STDY UNATTENDED: CPT | Mod: 26

## 2022-09-20 DIAGNOSIS — G47.33 OBSTRUCTIVE SLEEP APNEA (ADULT) (PEDIATRIC): ICD-10-CM

## 2022-10-17 NOTE — ED ADULT TRIAGE NOTE - TEMPERATURE IN CELSIUS (DEGREES C)
Pt has not had documented BM since 10/9, updated MD. Unable to give miralax, as taking large quantities of liquid is difficult for pt. MD ordered senna 2 tabs daily, and dulcolax suppository. Senna administered without effect. After approximately four hours, suppository was administered. Will continue to monitor pt for any changes in condition.   37.1

## 2022-12-27 ENCOUNTER — APPOINTMENT (OUTPATIENT)
Dept: PULMONOLOGY | Facility: CLINIC | Age: 67
End: 2022-12-27

## 2022-12-27 ENCOUNTER — APPOINTMENT (OUTPATIENT)
Dept: HEART AND VASCULAR | Facility: CLINIC | Age: 67
End: 2022-12-27
Payer: COMMERCIAL

## 2022-12-27 VITALS
WEIGHT: 188.38 LBS | BODY MASS INDEX: 30.28 KG/M2 | OXYGEN SATURATION: 97 % | TEMPERATURE: 97.1 F | HEIGHT: 66 IN | SYSTOLIC BLOOD PRESSURE: 121 MMHG | DIASTOLIC BLOOD PRESSURE: 76 MMHG | HEART RATE: 69 BPM

## 2022-12-27 PROCEDURE — 99214 OFFICE O/P EST MOD 30 MIN: CPT

## 2022-12-28 NOTE — PHYSICAL EXAM
[General Appearance - Well Developed] : well developed [Normal Appearance] : normal appearance [Well Groomed] : well groomed [General Appearance - Well Nourished] : well nourished [No Deformities] : no deformities [General Appearance - In No Acute Distress] : no acute distress [Normal Conjunctiva] : the conjunctiva exhibited no abnormalities [Normal Oropharynx] : normal oropharynx [Neck Appearance] : the appearance of the neck was normal [Apical Impulse] : the apical impulse was normal [Heart Rate And Rhythm] : heart rate was normal and rhythm regular [] : no respiratory distress [Respiration, Rhythm And Depth] : normal respiratory rhythm and effort [Exaggerated Use Of Accessory Muscles For Inspiration] : no accessory muscle use [Auscultation Breath Sounds / Voice Sounds] : lungs were clear to auscultation bilaterally [Abnormal Walk] : normal gait [Oriented To Time, Place, And Person] : oriented to person, place, and time [Impaired Insight] : insight and judgment were intact [Affect] : the affect was normal [Mood] : the mood was normal

## 2022-12-28 NOTE — REVIEW OF SYSTEMS
[EDS: ESS=____] : daytime somnolence: ESS=[unfilled] [Obesity] : obesity [Difficulty Initiating Sleep] : no difficulty falling asleep [Difficulty Maintaining Sleep] : no difficulty maintaining sleep [Lower Extremity Discomfort] : no lower extremity discomfort [Late day/ Evening symptoms] : no late day/evening symptoms [Unusual Sleep Behavior] : no unusual sleep behavior [Hypersomnolence] : not sleeping much more than usual [Cataplexy] :  no cataplexy [Negative] : Psychiatric

## 2022-12-28 NOTE — HISTORY OF PRESENT ILLNESS
[FreeTextEntry1] : 64y with long standing AYE on CPAP, ?childhood asthma referred for AYE management. Last sleep study was aobut 1 year ago; severity is unclear but she did have low oxygen saturations. Study was overnight; not available for review. Has a new machine; about 1 year old. Does not perceive a subjective benefit despite use. Use is not very consistent but even when it is she is still not endorsing great benefit. Total sleep time is around hours per night. DME is [facility]. She does not know the severity of her AYE when she was initially diagnosed about 10 years ago. \par \par 6/1/2022\par Has not followed up since 2019. Has since lost over 50 lbs. Sleeping is improved. Has stopped PAP use.\par \par 12/27/2022\par Doing okay. Had HST and would like to discuss results. Continues to have insomnia. [ESS] : 5

## 2022-12-28 NOTE — CONSULT LETTER
[Dear  ___] : Dear  [unfilled], [Courtesy Letter:] : I had the pleasure of seeing your patient, [unfilled], in my office today. [Please see my note below.] : Please see my note below. [Consult Closing:] : Thank you very much for allowing me to participate in the care of this patient.  If you have any questions, please do not hesitate to contact me. [Sincerely,] : Sincerely, [FreeTextEntry3] : Vero Patel MD\par \par [place] [facility] [employer] [facility]\par Pulmonary, Critical Care, and Sleep Medicine\par

## 2022-12-28 NOTE — ASSESSMENT
[FreeTextEntry1] : REVIEWED\par 1. HST 9/2022: AHI 26.3 4%; AHI=32.9 3%; T88 7.8%\par 2. HST from 3/30/2018 reviewed:\par *.5 minutes\par *AHI 23 events/hour (4%)\par *Prince oxygen saturation of 72%\par \par \par 64y with long standing AYE on CPAP, ?childhood asthma following up for AYE. Hx of moderate AYE. Repeat HST c/w with moderate to severe AYE. Should be treated. Discussed returning to PAP and trying it again despite PAP intolerance in the past versus CN 12 stimulation. She meets criteria for CN12 stimulation based on AHI and BMI; would need DISE; pros/cons of stimulation discussed including neurapraxia. She will think about her options and get back to me. She verbalized understanding regarding risks of untreated AYE.

## 2023-01-04 NOTE — ED PROVIDER NOTE - PATIENT PORTAL LINK FT
You can access the FollowMyHealth Patient Portal offered by [facility] by registering at the following website: [email or web address]. By joining [email or web address]’s FollowMyHealth portal, you will also be able to view your health information using other applications (apps) compatible with our system.
82

## 2023-01-09 ENCOUNTER — NON-APPOINTMENT (OUTPATIENT)
Age: 68
End: 2023-01-09

## 2023-01-09 ENCOUNTER — APPOINTMENT (OUTPATIENT)
Dept: HEART AND VASCULAR | Facility: CLINIC | Age: 68
End: 2023-01-09
Payer: COMMERCIAL

## 2023-01-09 VITALS
HEIGHT: 66 IN | TEMPERATURE: 96.6 F | SYSTOLIC BLOOD PRESSURE: 134 MMHG | OXYGEN SATURATION: 96 % | HEART RATE: 83 BPM | DIASTOLIC BLOOD PRESSURE: 75 MMHG | BODY MASS INDEX: 30.7 KG/M2 | WEIGHT: 191.06 LBS

## 2023-01-09 PROCEDURE — 93000 ELECTROCARDIOGRAM COMPLETE: CPT

## 2023-01-09 PROCEDURE — 99214 OFFICE O/P EST MOD 30 MIN: CPT | Mod: 25

## 2023-01-09 RX ORDER — DEXTROAMPHETAMINE SACCHARATE, AMPHETAMINE ASPARTATE, DEXTROAMPHETAMINE SULFATE AND AMPHETAMINE SULFATE 5; 5; 5; 5 MG/1; MG/1; MG/1; MG/1
20 TABLET ORAL
Qty: 30 | Refills: 0 | Status: DISCONTINUED | COMMUNITY
Start: 2022-03-10 | End: 2023-01-09

## 2023-01-09 RX ORDER — BUDESONIDE AND FORMOTEROL FUMARATE DIHYDRATE 160; 4.5 UG/1; UG/1
160-4.5 AEROSOL RESPIRATORY (INHALATION)
Qty: 10 | Refills: 0 | Status: DISCONTINUED | COMMUNITY
Start: 2020-04-02 | End: 2023-01-09

## 2023-01-09 RX ORDER — MONTELUKAST 10 MG/1
10 TABLET, FILM COATED ORAL
Qty: 30 | Refills: 0 | Status: DISCONTINUED | COMMUNITY
Start: 2020-02-18 | End: 2023-01-09

## 2023-01-09 RX ORDER — FAMOTIDINE 20 MG/1
20 TABLET, FILM COATED ORAL
Qty: 60 | Refills: 5 | Status: DISCONTINUED | COMMUNITY
Start: 2019-10-10 | End: 2023-01-09

## 2023-01-09 RX ORDER — FLUTICASONE PROPIONATE 50 UG/1
50 SPRAY, METERED NASAL
Qty: 1 | Refills: 5 | Status: DISCONTINUED | COMMUNITY
Start: 2019-10-10 | End: 2023-01-09

## 2023-01-09 RX ORDER — UBIDECARENONE/VIT E ACET 100MG-5
100 CAPSULE ORAL
Refills: 0 | Status: DISCONTINUED | COMMUNITY
Start: 2021-01-26 | End: 2023-01-09

## 2023-01-09 NOTE — REVIEW OF SYSTEMS
[Weight Loss (___ Lbs)] : [unfilled] ~Ulb weight loss [Negative] : Heme/Lymph [Depression] : depression [Under Stress] : under stress

## 2023-01-09 NOTE — HISTORY OF PRESENT ILLNESS
[FreeTextEntry1] : Ms. Dasilva presents for follow up and management of impaired fasting glucose, dyslipidemia, AYE, and left MCA CVA (08/03/16). She was admitted to [facility] on 08/03/16 after having sudden onset of left hand numbness and aphasia. The symptoms persisted for several hours. She had a CT head and CTA head an neck on 08/03/16 both of which were normal. She had a MR of the brain on 08/04/16 which revealed a small left insula infarct. She was evaluated by neurology, Cheyenne Kilpatrick MD and was diagnosed with a left MCA CVA. She also saw a heart rhythm doctor, Arie Kerns MD, and had an loop recorder implanted. She had an echocardiogram which revealed normal LV systolic function and no valvular heart disease. She had paroxysmal atrial tachycardia as a child. Since being discharged, she has not had recurrence of her symptoms. We had an extensive discussion about the embolic nature of her stroke and the likelihood that there may be occult atrial fibrillation. She has completed the RESPECT-ESUS research trial for patients with embolic CVA of undetermined source to study ASA vs. Pradaxa.  She denies palpitations or bleeding. Her implantable loop recorder did not reveal any evidence of dysrhythmia.   She had followed with a psychiatrist in the past and was on an SSRI. She will be traveling to [place] in the near future within the week.  She admits to dietary indiscretion with ice cream and has gained about 15 pounds as a result.  We had an extensive discussion about therapeutic lifestyle changes to promote increased cardiovascular fitness and achieving goal weight.   She was last seen by me in the office on 11/23/21. She has experienced rare palpitations over the past year. At present, she continues to have depression. She is currently willing to try pharmacotherapy with her psychiatrist.  She will likely be retiring from work in the summer of 2023.

## 2023-01-09 NOTE — ASSESSMENT
[FreeTextEntry1] : 1. Cerebrovascular accident (CVA): CVA: small left insula (MCA) (08/03/16); symptoms resolved\par             - follow up with neurologist, Cheyenne Kilpatrick MD\par             - completed RESPECT-ESUS trial (ASA 100mg po daily vs. Pradaxa 150mg po bid)\par             - continue ASA 81mg po daily \par             - will send for an echocardiogram to rule out structural heart disease\par \par 2. r/o occult AF: given history of PAT and embolic CVA 08/03/16, has implantable loop recorder in place, no events to date, battery depleted: \par             - follow up with Paulino Cho MD for ILR explantation if desired (she wishes to defer at this time) \par  \par 3. DM2: A1c 7.0 (10/18/21)\par            - discussed with patient therapeutic lifestyle changes to improve glucose metabolism\par            - patient will provide copy of recent lab work from PMD's office for my review   \par \par 4. Dyslipidemia: LDL 99 (10/18/21)\par            - continue atorvastatin 80mg po qd\par            - discussed therapeutic lifestyle changes to promote improved lipid metabolism \par            - patient will provide copy of recent lab work from PMD's office for my review \par  \par 7. Depression: non-major: h/o SSRI use and psychiatry visits\par             - patient willing to to see a psychiatrist and retry an SSRI at this time\par  \par 8. AYE: moderate (11/2019), not compliant with CPAP\par             - follow up with sleep medicine specialist, Edward Patel MD\par \par The patient was seen and examined with a cardiology fellow as part of their longitudinal ambulatory cardiology training experience.  Permission was obtained at the time of the visit from the patient for the fellow's participation. \par \par

## 2023-01-09 NOTE — REASON FOR VISIT
[Other: ____] : [unfilled] [FreeTextEntry1] : Diagnostic Tests:\par --------------------------------------\par ECG:\par 01/09/23: sinus rhythm, normal ECG. \par 11/23/21: sinus rhythm, normal ECG. \par 01/26/21: NSR, single PVC. \par 02/25/19: NSR, normal ECG. \par 04/09/18: NSR, frequent APCs.\par 08/11/16: NSR, normal ECG.\par --------------------------------------\par Echo:\par 03/11/19: EF 58%, normal echo. \par 05/01/18: EF 63%, trace MR/TR. \par 08/04/16: EF 65%, grade I diastolic dysfunction, trace TR\par --------------------------------------\par CT:\par 08/03/16: head: normal\par 08/03/16: CTA head and neck: normal \par --------------------------------------\par MR:\par 08/04/16: brain: small left insula infarct.

## 2023-01-27 ENCOUNTER — TRANSCRIPTION ENCOUNTER (OUTPATIENT)
Age: 68
End: 2023-01-27

## 2023-01-30 ENCOUNTER — APPOINTMENT (OUTPATIENT)
Dept: RHEUMATOLOGY | Facility: CLINIC | Age: 68
End: 2023-01-30

## 2023-02-03 ENCOUNTER — APPOINTMENT (OUTPATIENT)
Dept: RHEUMATOLOGY | Facility: CLINIC | Age: 68
End: 2023-02-03

## 2023-02-23 ENCOUNTER — NON-APPOINTMENT (OUTPATIENT)
Age: 68
End: 2023-02-23

## 2023-02-23 ENCOUNTER — APPOINTMENT (OUTPATIENT)
Dept: HEART AND VASCULAR | Facility: CLINIC | Age: 68
End: 2023-02-23
Payer: COMMERCIAL

## 2023-02-23 PROCEDURE — 93306 TTE W/DOPPLER COMPLETE: CPT

## 2023-05-09 ENCOUNTER — APPOINTMENT (OUTPATIENT)
Dept: HEART AND VASCULAR | Facility: CLINIC | Age: 68
End: 2023-05-09
Payer: COMMERCIAL

## 2023-05-09 VITALS
TEMPERATURE: 97.6 F | HEIGHT: 66 IN | HEART RATE: 77 BPM | SYSTOLIC BLOOD PRESSURE: 123 MMHG | BODY MASS INDEX: 30.95 KG/M2 | WEIGHT: 192.56 LBS | DIASTOLIC BLOOD PRESSURE: 78 MMHG | OXYGEN SATURATION: 94 %

## 2023-05-09 DIAGNOSIS — M79.89 OTHER SPECIFIED SOFT TISSUE DISORDERS: ICD-10-CM

## 2023-05-09 PROCEDURE — 99214 OFFICE O/P EST MOD 30 MIN: CPT

## 2023-05-09 NOTE — HISTORY OF PRESENT ILLNESS
[FreeTextEntry1] : Ms. Dasilva presents for follow up and management of impaired fasting glucose, dyslipidemia, AYE, and left MCA CVA (08/03/16). She was admitted to [facility] on 08/03/16 after having sudden onset of left hand numbness and aphasia. The symptoms persisted for several hours. She had a CT head and CTA head an neck on 08/03/16 both of which were normal. She had a MR of the brain on 08/04/16 which revealed a small left insula infarct. She was evaluated by neurology, Cheyenne Kilpatrick MD and was diagnosed with a left MCA CVA. She also saw a heart rhythm doctor, Arie Kerns MD, and had an loop recorder implanted. She had an echocardiogram which revealed normal LV systolic function and no valvular heart disease. She had paroxysmal atrial tachycardia as a child. Since being discharged, she has not had recurrence of her symptoms. We had an extensive discussion about the embolic nature of her stroke and the likelihood that there may be occult atrial fibrillation. She has completed the RESPECT-ESUS research trial for patients with embolic CVA of undetermined source to study ASA vs. Pradaxa.  She denies palpitations or bleeding. Her implantable loop recorder did not reveal any evidence of dysrhythmia.   She had followed with a psychiatrist in the past and was on an SSRI. She will be traveling to [place] in the near future within the week.  She admits to dietary indiscretion with ice cream and has gained about 15 pounds as a result.  We had an extensive discussion about therapeutic lifestyle changes to promote increased cardiovascular fitness and achieving goal weight.  She was last seen by me in the office on 11/23/21. She has experienced rare palpitations over the past year. At present, she continues to have depression. She is currently willing to try pharmacotherapy with her psychiatrist.  On 02/23/23, she had an echocardiogram which revealed an EF of 63% and mild LVH.  She retired on 05/02/23.  At present, she has a resolving conjunctivitis but is otherwise well.

## 2023-05-09 NOTE — ASSESSMENT
[FreeTextEntry1] : 1. Cerebrovascular accident (CVA): CVA: small left insula (MCA) (08/03/16): symptoms resolved: \par             - follow up with neurologist, Cheyenne Kilpatrick MD\par             - completed RESPECT-ESUS trial (ASA 100mg po daily vs. Pradaxa 150mg po bid)\par             - continue ASA 81mg po daily \par \par 2. r/o occult AF: given history of PAT and embolic CVA 08/03/16, has implantable loop recorder in place, no events to date, battery depleted: \par             - follow up with Paulino Cho MD for ILR explantation if desired (she wishes to defer at this time) \par  \par 3. DM2: A1c 7.0 (10/18/21)\par            - discussed with patient therapeutic lifestyle changes to improve glucose metabolism\par            - patient will provide copy of recent lab work from PMD's office for my review   \par \par 4. Dyslipidemia: LDL 99 (10/18/21): \par            - continue atorvastatin 80mg po qd\par            - discussed therapeutic lifestyle changes to promote improved lipid metabolism \par            - patient will provide copy of recent lab work from PMD's office for my review \par  \par 7. Depression: non-major: h/o SSRI use and psychiatry visits\par             - patient willing to to see a psychiatrist and retry an SSRI at this time\par  \par 8. AYE: moderate (11/2019), not compliant with CPAP: \par             - follow up with sleep medicine specialist, Vero Patel MD\par \par

## 2023-05-09 NOTE — REASON FOR VISIT
[FreeTextEntry1] : Diagnostic Tests:\par --------------------------------------\par ECG:\par 01/09/23: sinus rhythm, normal ECG. \par 11/23/21: sinus rhythm, normal ECG. \par 01/26/21: NSR, single PVC. \par 02/25/19: NSR, normal ECG. \par 04/09/18: NSR, frequent APCs.\par 08/11/16: NSR, normal ECG.\par --------------------------------------\par Echo:\par 02/23/23: EF 63%, mild LVH. \par 03/11/19: EF 58%, normal echo. \par 05/01/18: EF 63%, trace MR/TR. \par 08/04/16: EF 65%, grade I diastolic dysfunction, trace TR\par --------------------------------------\par CT:\par 08/03/16: head: normal\par 08/03/16: CTA head and neck: normal \par --------------------------------------\par MR:\par 08/04/16: brain: small left insula infarct.

## 2023-05-26 ENCOUNTER — NON-APPOINTMENT (OUTPATIENT)
Age: 68
End: 2023-05-26

## 2023-06-05 ENCOUNTER — APPOINTMENT (OUTPATIENT)
Dept: RHEUMATOLOGY | Facility: CLINIC | Age: 68
End: 2023-06-05
Payer: MEDICARE

## 2023-06-05 VITALS
DIASTOLIC BLOOD PRESSURE: 79 MMHG | SYSTOLIC BLOOD PRESSURE: 116 MMHG | OXYGEN SATURATION: 98 % | WEIGHT: 187 LBS | HEART RATE: 72 BPM | HEIGHT: 66 IN | TEMPERATURE: 97.6 F | BODY MASS INDEX: 30.05 KG/M2

## 2023-06-05 DIAGNOSIS — M85.80 OTHER SPECIFIED DISORDERS OF BONE DENSITY AND STRUCTURE, UNSPECIFIED SITE: ICD-10-CM

## 2023-06-05 DIAGNOSIS — M19.90 UNSPECIFIED OSTEOARTHRITIS, UNSPECIFIED SITE: ICD-10-CM

## 2023-06-05 PROCEDURE — 99214 OFFICE O/P EST MOD 30 MIN: CPT

## 2023-06-07 PROBLEM — M19.90 ARTHRITIS: Status: ACTIVE | Noted: 2019-02-21

## 2023-06-08 NOTE — DATA REVIEWED
[FreeTextEntry1] : EXAM: XR WRIST COMP MIN 3 VIEWS BI \par EXAM: XR HAND MIN 3 VIEWS BI \par \par PROCEDURE DATE: 05/18/2020 \par \par \par \par \par INTERPRETATION: INDICATION: ARTHRAIGIA \par \par 3 views of each hand and 3 views of each wrist have been submitted. No \par fracture or dislocation is identified. Carpal joint spaces are preserved. \par There is asymmetric joint space narrowing subchondral sclerosis and \par osteophyte formation at the DIP joints of both hands, most pronounced in the \par fifth digit. No periostitis or erosion is identified. \par \par \par IMPRESSION: Findings consistent with osteoarthritis involving the DIP joints \par of the hands.  Health Care Proxy (HCP)

## 2023-06-08 NOTE — REVIEW OF SYSTEMS
[Arthralgias] : arthralgias [Negative] : Heme/Lymph [Joint Swelling] : no joint swelling [Joint Stiffness] : no joint stiffness

## 2023-06-08 NOTE — HISTORY OF PRESENT ILLNESS
[FreeTextEntry1] : June 5, 2023\par Patient returns for follow up, last visit with Dr. Billingsley, 2/4/2022\par Arthritis in the hands, hips, and elbows\par Prescribed voltaren gel previously\par Has cats, hard to use the ointment\par Does not usually more than 1-2 times per month\par Takes ASA 81 mg and atorvastatin\par previously had tendinitis of the elbows when gardening, stopped and helps\par Artist and some gardening recently\par Artist with left hand, but otherwise right hand dominant\par Hard to get up from loft bed, stretches daily\par Does not exercise regularly but walks often\par Magnesium, calcium, vitamin D\par Sometimes takes tumeric and luis fernando\par Codeine makes her feel unwell\par Went to [facility] for the left foot, had xrays, "jammed" big toe, no fracture\par Dr. Ruth recommended hand therapist, but did not complete at that time\par \par Follow up: 2/4/22 \par 68 y/o F with hand OA and other poly OA affecting toes and likely L spine. \par She has not been using arthritis glove or topical diclofenac gel as advised during last visit. Thinks finger deformity has been getting worse. \par Osteopenia: on calcium and vit D supplements \par \par Follow up: 8/17/21 \par \par 65 y/o F with hand OA and other poly OA affecting toes and likely L spine. \par She has b/l  2nd toe flexion, only can use certain shoes. \par Medial epicondylitis of left  elbow: resolved \par Had essentially normal labs, hand  Xray  reviewed with pt and suggestive hand OA, worse affecting  left 5th DIP where pt has most symptoms. \par DEXA-osteopenia. \par \par

## 2023-06-08 NOTE — ASSESSMENT
[FreeTextEntry1] : 68 year old woman returns for rheumatology follow up. Patient feeling increased hand symptoms, history of osteoarthritis, will repeat hand xrays for further evaluation. Mild medial epicondylitis as well.  Previous work up for inflammatory arthritis unremarkable, previous imaging reviewed with patient.  Patient with osteopenia on previous DEXA, will repeat DEXA as well.  Referral for occupational therapy provide for patient. \par

## 2023-06-08 NOTE — PHYSICAL EXAM
[General Appearance - Alert] : alert [General Appearance - In No Acute Distress] : in no acute distress [General Appearance - Well-Appearing] : healthy appearing [Sclera] : the sclera and conjunctiva were normal [] : no respiratory distress [Respiration, Rhythm And Depth] : normal respiratory rhythm and effort [Exaggerated Use Of Accessory Muscles For Inspiration] : no accessory muscle use [Abnormal Walk] : normal gait [FreeTextEntry1] : no synovitis, DIP enlargement nontender

## 2023-08-29 ENCOUNTER — APPOINTMENT (OUTPATIENT)
Dept: RADIOLOGY | Facility: CLINIC | Age: 68
End: 2023-08-29
Payer: MEDICARE

## 2023-08-29 ENCOUNTER — OUTPATIENT (OUTPATIENT)
Dept: OUTPATIENT SERVICES | Facility: HOSPITAL | Age: 68
LOS: 1 days | End: 2023-08-29

## 2023-08-29 PROCEDURE — 73120 X-RAY EXAM OF HAND: CPT | Mod: 26,50

## 2023-08-29 PROCEDURE — 77080 DXA BONE DENSITY AXIAL: CPT | Mod: 26

## 2023-11-07 ENCOUNTER — APPOINTMENT (OUTPATIENT)
Dept: PULMONOLOGY | Facility: CLINIC | Age: 68
End: 2023-11-07
Payer: COMMERCIAL

## 2023-11-07 VITALS
DIASTOLIC BLOOD PRESSURE: 65 MMHG | WEIGHT: 199 LBS | TEMPERATURE: 97.1 F | HEIGHT: 65 IN | SYSTOLIC BLOOD PRESSURE: 119 MMHG | BODY MASS INDEX: 33.15 KG/M2 | OXYGEN SATURATION: 95 % | HEART RATE: 78 BPM

## 2023-11-07 PROCEDURE — 99214 OFFICE O/P EST MOD 30 MIN: CPT

## 2024-04-16 RX ORDER — ATORVASTATIN CALCIUM 40 MG/1
40 TABLET, FILM COATED ORAL DAILY
Qty: 90 | Refills: 3 | Status: ACTIVE | COMMUNITY
Start: 2020-10-15 | End: 1900-01-01

## 2024-05-04 PROBLEM — Z95.818 STATUS POST PLACEMENT OF IMPLANTABLE LOOP RECORDER: Status: ACTIVE | Noted: 2019-02-21

## 2024-05-04 PROBLEM — E78.5 DYSLIPIDEMIA: Status: ACTIVE | Noted: 2019-02-21

## 2024-05-04 PROBLEM — G47.33 OBSTRUCTIVE SLEEP APNEA SYNDROME: Status: ACTIVE | Noted: 2019-02-21

## 2024-05-04 PROBLEM — I63.9 CVA (CEREBRAL VASCULAR ACCIDENT): Status: ACTIVE | Noted: 2019-02-21

## 2024-05-09 ENCOUNTER — APPOINTMENT (OUTPATIENT)
Dept: HEART AND VASCULAR | Facility: CLINIC | Age: 69
End: 2024-05-09
Payer: MEDICARE

## 2024-05-09 ENCOUNTER — NON-APPOINTMENT (OUTPATIENT)
Age: 69
End: 2024-05-09

## 2024-05-09 VITALS
HEIGHT: 65 IN | OXYGEN SATURATION: 96 % | HEART RATE: 77 BPM | WEIGHT: 205.31 LBS | TEMPERATURE: 97.6 F | SYSTOLIC BLOOD PRESSURE: 120 MMHG | BODY MASS INDEX: 34.21 KG/M2 | DIASTOLIC BLOOD PRESSURE: 73 MMHG

## 2024-05-09 DIAGNOSIS — G47.33 OBSTRUCTIVE SLEEP APNEA (ADULT) (PEDIATRIC): ICD-10-CM

## 2024-05-09 DIAGNOSIS — E78.5 HYPERLIPIDEMIA, UNSPECIFIED: ICD-10-CM

## 2024-05-09 DIAGNOSIS — I63.9 CEREBRAL INFARCTION, UNSPECIFIED: ICD-10-CM

## 2024-05-09 DIAGNOSIS — Z95.818 PRESENCE OF OTHER CARDIAC IMPLANTS AND GRAFTS: ICD-10-CM

## 2024-05-09 PROCEDURE — 99215 OFFICE O/P EST HI 40 MIN: CPT

## 2024-05-09 PROCEDURE — 93000 ELECTROCARDIOGRAM COMPLETE: CPT

## 2024-05-09 PROCEDURE — G2211 COMPLEX E/M VISIT ADD ON: CPT

## 2024-05-09 NOTE — ASSESSMENT
[FreeTextEntry1] : ======================================================================================= 1. Cerebrovascular accident (CVA): CVA: small left insula (MCA) (08/03/16): symptoms resolved:              - follow up with neurologist, Cheyenne Kilpatrick MD             - completed RESPECT-ESUS trial (ASA 100mg po daily vs. Pradaxa 150mg po bid)             - continue ASA 81mg po daily   2. 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 DM2: A1c 7.0 (10/18/21)            - discussed with patient therapeutic lifestyle changes to improve glucose metabolism            - check lab work today    4. Dyslipidemia: LDL 99 (10/18/21):             - continue atorvastatin 80mg po qd            - discussed therapeutic lifestyle changes to promote improved lipid metabolism             - check lab work today    7. Depression: non-major: h/o SSRI use and psychiatry visits             - patient willing to to see a psychiatrist and retry an SSRI at this time   8. AYE: moderate (11/2019), not compliant with CPAP:              - follow up with sleep medicine specialist, Vero Patel MD

## 2024-05-09 NOTE — REASON FOR VISIT
[FreeTextEntry1] : ======================================================================================= Diagnostic Tests: -------------------------------------------------------------- EC24: sinus rhythm, normal ECG.  23: sinus rhythm, normal ECG.  21: sinus rhythm, normal ECG.  21: NSR, single PVC.  19: NSR, normal ECG.  18: NSR, frequent APCs. 16: NSR, normal ECG. -------------------------------------------------------------- Echo: 23: EF 63%, mild LVH.  19: EF 58%, normal echo.  18: EF 63%, trace MR/TR.  16: EF 65%, grade I diastolic dysfunction, trace TR -------------------------------------------------------------- CT: 16: head: normal 16: CTA head and neck: normal  -------------------------------------------------------------- MR: 16: brain: small left insula infarct.

## 2024-05-09 NOTE — HISTORY OF PRESENT ILLNESS
[FreeTextEntry1] : Ms. Dasilva presents for follow up and management of impaired fasting glucose, dyslipidemia, AYE, and left MCA CVA (08/03/16). She was admitted to [facility] on 08/03/16 after having sudden onset of left hand numbness and aphasia. The symptoms persisted for several hours. She had a CT head and CTA head an neck on 08/03/16 both of which were normal. She had a MR of the brain on 08/04/16 which revealed a small left insula infarct. She was evaluated by neurology, Cheyenne Kilpatrick MD and was diagnosed with a left MCA CVA. She also saw a heart rhythm doctor, Arie Kerns MD, and had an loop recorder implanted. She had an echocardiogram which revealed normal LV systolic function and no valvular heart disease. She had paroxysmal atrial tachycardia as a child. Since being discharged, she has not had recurrence of her symptoms. We had an extensive discussion about the embolic nature of her stroke and the likelihood that there may be occult atrial fibrillation. She has completed the RESPECT-ESUS research trial for patients with embolic CVA of undetermined source to study ASA vs. Pradaxa.  She denies palpitations or bleeding. Her implantable loop recorder did not reveal any evidence of dysrhythmia.   She had followed with a psychiatrist in the past and was on an SSRI. She will be traveling to [place] in the near future within the week.  She admits to dietary indiscretion with ice cream and has gained about 15 pounds as a result.  We had an extensive discussion about therapeutic lifestyle changes to promote increased cardiovascular fitness and achieving goal weight.  She was last seen by me in the office on 11/23/21.  She is currently willing to try pharmacotherapy with her psychiatrist.  On 02/23/23, she had an echocardiogram which revealed an EF of 63% and mild LVH.  She retired on 05/02/23.  She has gained weight since last visit.

## 2024-05-10 DIAGNOSIS — E11.9 TYPE 2 DIABETES MELLITUS W/OUT COMPLICATIONS: ICD-10-CM

## 2024-05-10 DIAGNOSIS — R73.01 IMPAIRED FASTING GLUCOSE: ICD-10-CM

## 2024-05-10 LAB
ALBUMIN SERPL ELPH-MCNC: 4.4 G/DL
ALP BLD-CCNC: 94 U/L
ALT SERPL-CCNC: 19 U/L
ANION GAP SERPL CALC-SCNC: 12 MMOL/L
AST SERPL-CCNC: 21 U/L
BASOPHILS # BLD AUTO: 0.05 K/UL
BASOPHILS NFR BLD AUTO: 0.6 %
BILIRUB SERPL-MCNC: 0.6 MG/DL
BUN SERPL-MCNC: 16 MG/DL
CALCIUM SERPL-MCNC: 9.8 MG/DL
CHLORIDE SERPL-SCNC: 107 MMOL/L
CHOLEST SERPL-MCNC: 137 MG/DL
CO2 SERPL-SCNC: 23 MMOL/L
CREAT SERPL-MCNC: 0.83 MG/DL
EGFR: 77 ML/MIN/1.73M2
EOSINOPHIL # BLD AUTO: 0.25 K/UL
EOSINOPHIL NFR BLD AUTO: 3.2 %
ESTIMATED AVERAGE GLUCOSE: 140 MG/DL
GLUCOSE SERPL-MCNC: 135 MG/DL
HBA1C MFR BLD HPLC: 6.5 %
HCT VFR BLD CALC: 44.1 %
HDLC SERPL-MCNC: 51 MG/DL
HGB BLD-MCNC: 14 G/DL
IMM GRANULOCYTES NFR BLD AUTO: 0.1 %
LDLC SERPL DIRECT ASSAY-MCNC: 75 MG/DL
LYMPHOCYTES # BLD AUTO: 2.39 K/UL
LYMPHOCYTES NFR BLD AUTO: 30.6 %
MAN DIFF?: NORMAL
MCHC RBC-ENTMCNC: 28.4 PG
MCHC RBC-ENTMCNC: 31.7 GM/DL
MCV RBC AUTO: 89.5 FL
MONOCYTES # BLD AUTO: 0.54 K/UL
MONOCYTES NFR BLD AUTO: 6.9 %
NEUTROPHILS # BLD AUTO: 4.58 K/UL
NEUTROPHILS NFR BLD AUTO: 58.6 %
PLATELET # BLD AUTO: 194 K/UL
POTASSIUM SERPL-SCNC: 4.2 MMOL/L
PROT SERPL-MCNC: 6.5 G/DL
RBC # BLD: 4.93 M/UL
RBC # FLD: 14.1 %
SODIUM SERPL-SCNC: 142 MMOL/L
TRIGL SERPL-MCNC: 105 MG/DL
TSH SERPL-ACNC: 2.83 UIU/ML
WBC # FLD AUTO: 7.82 K/UL

## 2024-06-27 ENCOUNTER — APPOINTMENT (OUTPATIENT)
Dept: RHEUMATOLOGY | Facility: CLINIC | Age: 69
End: 2024-06-27
Payer: MEDICARE

## 2024-06-27 VITALS
TEMPERATURE: 96.7 F | OXYGEN SATURATION: 97 % | DIASTOLIC BLOOD PRESSURE: 69 MMHG | HEART RATE: 68 BPM | BODY MASS INDEX: 32.65 KG/M2 | WEIGHT: 196 LBS | SYSTOLIC BLOOD PRESSURE: 105 MMHG | HEIGHT: 65 IN

## 2024-06-27 DIAGNOSIS — M25.541 PAIN IN JOINTS OF RIGHT HAND: ICD-10-CM

## 2024-06-27 DIAGNOSIS — M19.049 PRIMARY OSTEOARTHRITIS, UNSPECIFIED HAND: ICD-10-CM

## 2024-06-27 DIAGNOSIS — M25.542 PAIN IN JOINTS OF RIGHT HAND: ICD-10-CM

## 2024-06-27 DIAGNOSIS — M79.676 PAIN IN UNSPECIFIED TOE(S): ICD-10-CM

## 2024-06-27 PROCEDURE — G2211 COMPLEX E/M VISIT ADD ON: CPT

## 2024-06-27 PROCEDURE — 99213 OFFICE O/P EST LOW 20 MIN: CPT

## 2024-11-11 ENCOUNTER — NON-APPOINTMENT (OUTPATIENT)
Age: 69
End: 2024-11-11

## 2024-11-11 ENCOUNTER — APPOINTMENT (OUTPATIENT)
Dept: HEART AND VASCULAR | Facility: CLINIC | Age: 69
End: 2024-11-11
Payer: MEDICARE

## 2024-11-11 VITALS
WEIGHT: 205.25 LBS | TEMPERATURE: 98.3 F | OXYGEN SATURATION: 95 % | DIASTOLIC BLOOD PRESSURE: 74 MMHG | SYSTOLIC BLOOD PRESSURE: 125 MMHG | HEIGHT: 65 IN | BODY MASS INDEX: 34.2 KG/M2 | HEART RATE: 64 BPM

## 2024-11-11 DIAGNOSIS — Z95.818 PRESENCE OF OTHER CARDIAC IMPLANTS AND GRAFTS: ICD-10-CM

## 2024-11-11 DIAGNOSIS — I63.9 CEREBRAL INFARCTION, UNSPECIFIED: ICD-10-CM

## 2024-11-11 DIAGNOSIS — E78.5 HYPERLIPIDEMIA, UNSPECIFIED: ICD-10-CM

## 2024-11-11 DIAGNOSIS — G47.33 OBSTRUCTIVE SLEEP APNEA (ADULT) (PEDIATRIC): ICD-10-CM

## 2024-11-11 DIAGNOSIS — E11.9 TYPE 2 DIABETES MELLITUS W/OUT COMPLICATIONS: ICD-10-CM

## 2024-11-11 PROCEDURE — 99214 OFFICE O/P EST MOD 30 MIN: CPT | Mod: 25

## 2024-11-11 PROCEDURE — 93306 TTE W/DOPPLER COMPLETE: CPT | Mod: 59

## 2024-11-11 PROCEDURE — 93000 ELECTROCARDIOGRAM COMPLETE: CPT

## 2024-11-11 RX ORDER — BUPROPION HYDROCHLORIDE 150 MG/1
150 TABLET, EXTENDED RELEASE ORAL DAILY
Refills: 0 | Status: ACTIVE | COMMUNITY

## 2024-11-13 ENCOUNTER — APPOINTMENT (OUTPATIENT)
Dept: OPHTHALMOLOGY | Facility: CLINIC | Age: 69
End: 2024-11-13

## 2024-11-13 ENCOUNTER — NON-APPOINTMENT (OUTPATIENT)
Age: 69
End: 2024-11-13

## 2024-11-13 PROCEDURE — 92133 CPTRZD OPH DX IMG PST SGM ON: CPT

## 2024-11-13 PROCEDURE — 92004 COMPRE OPH EXAM NEW PT 1/>: CPT

## 2025-02-14 ENCOUNTER — APPOINTMENT (OUTPATIENT)
Dept: RHEUMATOLOGY | Facility: CLINIC | Age: 70
End: 2025-02-14
Payer: MEDICARE

## 2025-02-14 VITALS
HEART RATE: 84 BPM | SYSTOLIC BLOOD PRESSURE: 123 MMHG | TEMPERATURE: 98.2 F | DIASTOLIC BLOOD PRESSURE: 67 MMHG | HEIGHT: 65 IN | BODY MASS INDEX: 32.15 KG/M2 | OXYGEN SATURATION: 95 % | WEIGHT: 193 LBS

## 2025-02-14 DIAGNOSIS — M19.049 PRIMARY OSTEOARTHRITIS, UNSPECIFIED HAND: ICD-10-CM

## 2025-02-14 DIAGNOSIS — M25.542 PAIN IN JOINTS OF RIGHT HAND: ICD-10-CM

## 2025-02-14 DIAGNOSIS — M25.541 PAIN IN JOINTS OF RIGHT HAND: ICD-10-CM

## 2025-02-14 PROCEDURE — G2211 COMPLEX E/M VISIT ADD ON: CPT

## 2025-02-14 PROCEDURE — 99213 OFFICE O/P EST LOW 20 MIN: CPT

## 2025-02-28 ENCOUNTER — APPOINTMENT (OUTPATIENT)
Dept: ORTHOPEDIC SURGERY | Facility: CLINIC | Age: 70
End: 2025-02-28
Payer: MEDICARE

## 2025-02-28 DIAGNOSIS — M19.049 PRIMARY OSTEOARTHRITIS, UNSPECIFIED HAND: ICD-10-CM

## 2025-02-28 PROCEDURE — 73110 X-RAY EXAM OF WRIST: CPT | Mod: 50

## 2025-02-28 PROCEDURE — 99204 OFFICE O/P NEW MOD 45 MIN: CPT

## 2025-03-11 ENCOUNTER — APPOINTMENT (OUTPATIENT)
Dept: HEART AND VASCULAR | Facility: CLINIC | Age: 70
End: 2025-03-11
Payer: MEDICARE

## 2025-03-11 VITALS
BODY MASS INDEX: 32.32 KG/M2 | TEMPERATURE: 97.1 F | HEART RATE: 86 BPM | HEIGHT: 65 IN | OXYGEN SATURATION: 95 % | DIASTOLIC BLOOD PRESSURE: 77 MMHG | SYSTOLIC BLOOD PRESSURE: 136 MMHG | WEIGHT: 194 LBS

## 2025-03-11 VITALS — DIASTOLIC BLOOD PRESSURE: 69 MMHG | SYSTOLIC BLOOD PRESSURE: 107 MMHG

## 2025-03-11 DIAGNOSIS — I63.9 CEREBRAL INFARCTION, UNSPECIFIED: ICD-10-CM

## 2025-03-11 DIAGNOSIS — E11.9 TYPE 2 DIABETES MELLITUS W/OUT COMPLICATIONS: ICD-10-CM

## 2025-03-11 DIAGNOSIS — G47.33 OBSTRUCTIVE SLEEP APNEA (ADULT) (PEDIATRIC): ICD-10-CM

## 2025-03-11 DIAGNOSIS — E78.5 HYPERLIPIDEMIA, UNSPECIFIED: ICD-10-CM

## 2025-03-11 DIAGNOSIS — Z95.818 PRESENCE OF OTHER CARDIAC IMPLANTS AND GRAFTS: ICD-10-CM

## 2025-03-11 PROCEDURE — 99214 OFFICE O/P EST MOD 30 MIN: CPT

## 2025-03-11 PROCEDURE — G2211 COMPLEX E/M VISIT ADD ON: CPT

## 2025-05-29 ENCOUNTER — TRANSCRIPTION ENCOUNTER (OUTPATIENT)
Age: 70
End: 2025-05-29

## 2025-07-10 ENCOUNTER — APPOINTMENT (OUTPATIENT)
Dept: HEART AND VASCULAR | Facility: CLINIC | Age: 70
End: 2025-07-10
Payer: MEDICARE

## 2025-07-10 VITALS
BODY MASS INDEX: 31.99 KG/M2 | SYSTOLIC BLOOD PRESSURE: 127 MMHG | HEART RATE: 81 BPM | WEIGHT: 192 LBS | HEIGHT: 65 IN | OXYGEN SATURATION: 96 % | DIASTOLIC BLOOD PRESSURE: 70 MMHG

## 2025-07-10 PROCEDURE — 99214 OFFICE O/P EST MOD 30 MIN: CPT

## 2025-07-10 PROCEDURE — G2211 COMPLEX E/M VISIT ADD ON: CPT

## 2025-07-10 PROCEDURE — 93000 ELECTROCARDIOGRAM COMPLETE: CPT

## 2025-07-10 RX ORDER — ARIPIPRAZOLE 2 MG/1
2 TABLET ORAL DAILY
Refills: 0 | Status: ACTIVE | COMMUNITY

## 2025-07-14 NOTE — HISTORY OF PRESENT ILLNESS
Rikki Reyes  tolerated vidaza well with no complications.      Rikki Reyes is aware of future appt on 7-15-25 at 1pm     AVS declined.      
[FreeTextEntry1] : Ms. Dasilva presents for follow up and management of impaired fasting glucose, dyslipidemia, AYE, and left MCA CVA (08/03/16). She was admitted to [facility] on 08/03/16 after having sudden onset of left hand numbness and aphasia. The symptoms persisted for several hours. She had a CT head and CTA head an neck on 08/03/16 both of which were normal. She had a MR of the brain on 08/04/16 which revealed a small left insula infarct. She was evaluated by neurology, Cheyenne Kilpatrick MD and was diagnosed with a left MCA CVA. She also saw a heart rhythm doctor, Arie Kerns MD, and had an loop recorder implanted. She had an echocardiogram which revealed normal LV systolic function and no valvular heart disease. She had paroxysmal atrial tachycardia as a child. Since being discharged, she has not had recurrence of her symptoms. We had an extensive discussion about the embolic nature of her stroke and the likelihood that there may be occult atrial fibrillation. She has completed the RESPECT-ESUS research trial for patients with embolic CVA of undetermined source to study ASA vs. Pradaxa.  She denies palpitations or bleeding. Her implantable loop recorder interrogation today did not reveal any evidence of dysrhythmia.   She had followed with a psychiatrist in the past and was on an SSRI. We had an extensive discussion about strategies to treat her depression and, although she does not want to start seeing a psychiatrist again and is not willing to start an SSRI at this time. She feels better regarding her affective disorder.  She will be traveling to [place] in the near future.  She admits to dietary indiscretion with ice cream and has gained about 15 pounds as a result.  We had an extensive discussion about therapeutic lifestyle changes to promote increased cardiovascular fitness and achieving goal weight.  She is doing well during the COVID crisis.\par

## 2025-09-18 ENCOUNTER — APPOINTMENT (OUTPATIENT)
Dept: RHEUMATOLOGY | Facility: CLINIC | Age: 70
End: 2025-09-18